# Patient Record
Sex: FEMALE | Race: WHITE | Employment: OTHER | URBAN - METROPOLITAN AREA
[De-identification: names, ages, dates, MRNs, and addresses within clinical notes are randomized per-mention and may not be internally consistent; named-entity substitution may affect disease eponyms.]

---

## 2017-10-10 PROBLEM — F41.1 GENERALIZED ANXIETY DISORDER: Status: ACTIVE | Noted: 2017-10-10

## 2017-10-10 PROBLEM — M81.0 OSTEOPOROSIS, SENILE: Status: ACTIVE | Noted: 2017-10-10

## 2017-11-20 PROBLEM — E66.3 OVERWEIGHT: Status: ACTIVE | Noted: 2017-11-20

## 2018-01-29 ENCOUNTER — HOSPITAL ENCOUNTER (OUTPATIENT)
Dept: MAMMOGRAPHY | Age: 73
Discharge: HOME | End: 2018-01-29
Attending: FAMILY MEDICINE
Payer: MEDICARE

## 2018-01-29 DIAGNOSIS — M81.0 OSTEOPOROSIS, SENILE: ICD-10-CM

## 2018-01-29 PROCEDURE — 77080 DXA BONE DENSITY AXIAL: CPT

## 2018-03-01 PROBLEM — K29.60 REFLUX GASTRITIS: Status: ACTIVE | Noted: 2018-03-01

## 2018-03-01 PROBLEM — G89.29 CHRONIC BILATERAL LOW BACK PAIN WITHOUT SCIATICA: Status: ACTIVE | Noted: 2018-03-01

## 2018-03-01 PROBLEM — M54.50 CHRONIC BILATERAL LOW BACK PAIN WITHOUT SCIATICA: Status: ACTIVE | Noted: 2018-03-01

## 2018-03-01 PROBLEM — Z79.4 CONTROLLED TYPE 2 DIABETES MELLITUS WITHOUT COMPLICATION, WITH LONG-TERM CURRENT USE OF INSULIN (HCC): Status: ACTIVE | Noted: 2018-03-01

## 2018-03-01 PROBLEM — E11.9 CONTROLLED TYPE 2 DIABETES MELLITUS WITHOUT COMPLICATION, WITH LONG-TERM CURRENT USE OF INSULIN (HCC): Status: ACTIVE | Noted: 2018-03-01

## 2018-08-31 PROBLEM — E53.8 VITAMIN B12 DEFICIENCY: Status: ACTIVE | Noted: 2018-08-31

## 2019-03-14 PROBLEM — Z23 ENCOUNTER FOR IMMUNIZATION: Status: ACTIVE | Noted: 2019-03-14

## 2019-03-14 PROBLEM — Z13.31 SCREENING FOR DEPRESSION: Status: ACTIVE | Noted: 2019-03-14

## 2019-03-14 PROBLEM — Z00.00 MEDICARE ANNUAL WELLNESS VISIT, SUBSEQUENT: Status: ACTIVE | Noted: 2019-03-14

## 2019-03-14 PROBLEM — Z13.39 SCREENING FOR ALCOHOLISM: Status: ACTIVE | Noted: 2019-03-14

## 2019-08-18 PROBLEM — G25.81 RESTLESS LEGS SYNDROME: Status: ACTIVE | Noted: 2019-08-18

## 2019-09-23 PROBLEM — Z00.00 MEDICARE ANNUAL WELLNESS VISIT, SUBSEQUENT: Status: RESOLVED | Noted: 2019-03-14 | Resolved: 2019-09-23

## 2019-09-23 PROBLEM — Z23 ENCOUNTER FOR IMMUNIZATION: Status: RESOLVED | Noted: 2019-03-14 | Resolved: 2019-09-23

## 2020-01-27 ENCOUNTER — HOSPITAL ENCOUNTER (OUTPATIENT)
Dept: CT IMAGING | Age: 75
Discharge: HOME | End: 2020-01-27
Attending: FAMILY MEDICINE
Payer: MEDICARE

## 2020-01-27 DIAGNOSIS — R10.31 RIGHT LOWER QUADRANT ABDOMINAL PAIN: ICD-10-CM

## 2020-01-27 LAB — CREAT BLD-MCNC: 0.5 MG/DL (ref 0.8–1.5)

## 2020-01-27 PROCEDURE — 74011000258 HC RX REV CODE- 258

## 2020-01-27 PROCEDURE — 82565 ASSAY OF CREATININE: CPT

## 2020-01-27 PROCEDURE — 74011636320 HC RX REV CODE- 636/320

## 2020-01-27 PROCEDURE — 74177 CT ABD & PELVIS W/CONTRAST: CPT

## 2020-01-27 RX ORDER — SODIUM CHLORIDE 0.9 % (FLUSH) 0.9 %
10 SYRINGE (ML) INJECTION
Status: COMPLETED | OUTPATIENT
Start: 2020-01-27 | End: 2020-01-27

## 2020-01-27 RX ADMIN — DIATRIZOATE MEGLUMINE AND DIATRIZOATE SODIUM 15 ML: 660; 100 LIQUID ORAL; RECTAL at 15:48

## 2020-01-27 RX ADMIN — IOPAMIDOL 100 ML: 755 INJECTION, SOLUTION INTRAVENOUS at 15:47

## 2020-01-27 RX ADMIN — SODIUM CHLORIDE 100 ML: 900 INJECTION, SOLUTION INTRAVENOUS at 15:48

## 2020-01-27 RX ADMIN — Medication 10 ML: at 15:47

## 2020-02-03 NOTE — PROGRESS NOTES
CT scan did not show any acute inflammatory process. However they found a 1.2 cm indeterminate left adrenal nodule- they suggest to do a CT or MRI to visualize it better. I would suggest doing an MRI. Let me know if you are ok with that. Sent as a reply to patient's My Chart message.

## 2020-02-05 ENCOUNTER — HOSPITAL ENCOUNTER (OUTPATIENT)
Dept: GENERAL RADIOLOGY | Age: 75
Discharge: HOME | End: 2020-02-05
Attending: FAMILY MEDICINE

## 2020-02-05 DIAGNOSIS — R13.19 ESOPHAGEAL DYSPHAGIA: ICD-10-CM

## 2020-02-18 PROBLEM — E27.8 ADRENAL NODULE (HCC): Status: ACTIVE | Noted: 2020-02-18

## 2020-02-20 ENCOUNTER — HOSPITAL ENCOUNTER (OUTPATIENT)
Dept: GENERAL RADIOLOGY | Age: 75
Discharge: HOME | End: 2020-02-20
Attending: FAMILY MEDICINE
Payer: MEDICARE

## 2020-02-20 PROCEDURE — 74011000255 HC RX REV CODE- 255

## 2020-02-20 PROCEDURE — 74011000250 HC RX REV CODE- 250

## 2020-02-20 PROCEDURE — 74220 X-RAY XM ESOPHAGUS 1CNTRST: CPT

## 2020-02-20 RX ADMIN — BARIUM SULFATE 135 ML: 980 POWDER, FOR SUSPENSION ORAL at 08:25

## 2020-02-20 RX ADMIN — ANTACID/ANTIFLATULENT 4 G: 380; 550; 10; 10 GRANULE, EFFERVESCENT ORAL at 08:25

## 2020-02-20 RX ADMIN — BARIUM SULFATE 355 ML: 0.6 SUSPENSION ORAL at 08:25

## 2020-02-20 RX ADMIN — BARIUM SULFATE 700 MG: 700 TABLET ORAL at 08:25

## 2020-02-27 NOTE — PROGRESS NOTES
Gastroesophageal reflux, tertiary esophageal contractions, no evidence of esophageal stricture.   Please advise patient to make a follow-up appointment with her gastroenterologist, Dr. Maldonado Villatoro, who did her colonoscopy

## 2020-06-02 ENCOUNTER — HOSPITAL ENCOUNTER (OUTPATIENT)
Dept: MRI IMAGING | Age: 75
Discharge: HOME | End: 2020-06-02
Attending: FAMILY MEDICINE
Payer: MEDICARE

## 2020-06-02 DIAGNOSIS — E27.8 ADRENAL NODULE (HCC): ICD-10-CM

## 2020-06-02 PROCEDURE — 74183 MRI ABD W/O CNTR FLWD CNTR: CPT

## 2020-06-02 PROCEDURE — A9575 INJ GADOTERATE MEGLUMI 0.1ML: HCPCS

## 2020-06-02 PROCEDURE — 74011250636 HC RX REV CODE- 250/636

## 2020-06-02 PROCEDURE — 74011000258 HC RX REV CODE- 258

## 2020-06-02 RX ORDER — SODIUM CHLORIDE 0.9 % (FLUSH) 0.9 %
10 SYRINGE (ML) INJECTION
Status: COMPLETED | OUTPATIENT
Start: 2020-06-02 | End: 2020-06-02

## 2020-06-02 RX ORDER — GADOTERATE MEGLUMINE 376.9 MG/ML
13 INJECTION INTRAVENOUS
Status: COMPLETED | OUTPATIENT
Start: 2020-06-02 | End: 2020-06-02

## 2020-06-02 RX ADMIN — GADOTERATE MEGLUMINE 13 ML: 376.9 INJECTION INTRAVENOUS at 09:23

## 2020-06-02 RX ADMIN — Medication 10 ML: at 09:23

## 2020-06-02 RX ADMIN — SODIUM CHLORIDE 100 ML: 900 INJECTION, SOLUTION INTRAVENOUS at 09:23

## 2020-06-03 NOTE — PROGRESS NOTES
1 cm left adrenal nodule, likely an adenoma-which is benign. Please ask patient if she still has abdominal painif so, we can refer her to gastroenterology.

## 2020-09-09 PROBLEM — Z78.0 POSTMENOPAUSE: Status: ACTIVE | Noted: 2020-09-09

## 2020-10-09 PROBLEM — Z00.00 MEDICARE ANNUAL WELLNESS VISIT, SUBSEQUENT: Status: RESOLVED | Noted: 2019-03-14 | Resolved: 2020-10-09

## 2020-10-09 PROBLEM — Z23 ENCOUNTER FOR IMMUNIZATION: Status: RESOLVED | Noted: 2019-03-14 | Resolved: 2020-10-09

## 2020-10-13 ENCOUNTER — HOSPITAL ENCOUNTER (OUTPATIENT)
Dept: MAMMOGRAPHY | Age: 75
Discharge: HOME | End: 2020-10-13
Attending: FAMILY MEDICINE
Payer: MEDICARE

## 2020-10-13 DIAGNOSIS — Z78.0 POSTMENOPAUSE: ICD-10-CM

## 2020-10-13 PROCEDURE — 77080 DXA BONE DENSITY AXIAL: CPT

## 2020-10-14 NOTE — PROGRESS NOTES
DEXA scan shows significant interval density loss in the lumbar spine and hips since January 2018 now shows osteopenia. Please advise patient to follow a diet rich in calcium and to take over-the-counter plain vitamin D 2000 units daily. Will need to repeat DEXA scan in 2 years.

## 2021-05-03 PROBLEM — E27.8 ADRENAL NODULE (HCC): Status: RESOLVED | Noted: 2020-02-18 | Resolved: 2021-05-03

## 2022-02-15 PROBLEM — J30.89 ENVIRONMENTAL AND SEASONAL ALLERGIES: Status: ACTIVE | Noted: 2022-02-15

## 2022-03-18 PROBLEM — M81.0 OSTEOPOROSIS, SENILE: Status: ACTIVE | Noted: 2017-10-10

## 2022-03-19 PROBLEM — Z13.31 SCREENING FOR DEPRESSION: Status: ACTIVE | Noted: 2019-03-14

## 2022-03-19 PROBLEM — Z13.39 SCREENING FOR ALCOHOLISM: Status: ACTIVE | Noted: 2019-03-14

## 2022-03-19 PROBLEM — G89.29 CHRONIC BILATERAL LOW BACK PAIN WITHOUT SCIATICA: Status: ACTIVE | Noted: 2018-03-01

## 2022-03-19 PROBLEM — E53.8 VITAMIN B12 DEFICIENCY: Status: ACTIVE | Noted: 2018-08-31

## 2022-03-19 PROBLEM — K29.60 REFLUX GASTRITIS: Status: ACTIVE | Noted: 2018-03-01

## 2022-03-19 PROBLEM — Z78.0 POSTMENOPAUSE: Status: ACTIVE | Noted: 2020-09-09

## 2022-03-19 PROBLEM — M54.50 CHRONIC BILATERAL LOW BACK PAIN WITHOUT SCIATICA: Status: ACTIVE | Noted: 2018-03-01

## 2022-03-19 PROBLEM — Z79.4 CONTROLLED TYPE 2 DIABETES MELLITUS WITHOUT COMPLICATION, WITH LONG-TERM CURRENT USE OF INSULIN (HCC): Status: ACTIVE | Noted: 2018-03-01

## 2022-03-19 PROBLEM — E11.9 CONTROLLED TYPE 2 DIABETES MELLITUS WITHOUT COMPLICATION, WITH LONG-TERM CURRENT USE OF INSULIN (HCC): Status: ACTIVE | Noted: 2018-03-01

## 2022-03-19 PROBLEM — E66.3 OVERWEIGHT: Status: ACTIVE | Noted: 2017-11-20

## 2022-03-19 PROBLEM — J30.89 ENVIRONMENTAL AND SEASONAL ALLERGIES: Status: ACTIVE | Noted: 2022-02-15

## 2022-03-19 PROBLEM — F41.1 GENERALIZED ANXIETY DISORDER: Status: ACTIVE | Noted: 2017-10-10

## 2022-03-19 PROBLEM — G25.81 RESTLESS LEGS SYNDROME: Status: ACTIVE | Noted: 2019-08-18

## 2022-05-31 ENCOUNTER — TELEPHONE (OUTPATIENT)
Dept: FAMILY MEDICINE CLINIC | Facility: CLINIC | Age: 77
End: 2022-05-31

## 2022-05-31 DIAGNOSIS — G25.81 RESTLESS LEGS SYNDROME: Primary | ICD-10-CM

## 2022-05-31 RX ORDER — ROPINIROLE 1 MG/1
1 TABLET, FILM COATED ORAL 2 TIMES DAILY
Qty: 180 TABLET | Refills: 0 | Status: SHIPPED | OUTPATIENT
Start: 2022-05-31 | End: 2022-08-10 | Stop reason: DRUGHIGH

## 2022-06-07 RX ORDER — ROPINIROLE 1 MG/1
TABLET, FILM COATED ORAL
Qty: 180 TABLET | OUTPATIENT
Start: 2022-06-07

## 2022-07-07 RX ORDER — LISINOPRIL 10 MG/1
TABLET ORAL
Qty: 90 TABLET | OUTPATIENT
Start: 2022-07-07

## 2022-07-07 RX ORDER — ATORVASTATIN CALCIUM 10 MG/1
TABLET, FILM COATED ORAL
Qty: 90 TABLET | OUTPATIENT
Start: 2022-07-07

## 2022-08-10 ENCOUNTER — OFFICE VISIT (OUTPATIENT)
Dept: FAMILY MEDICINE CLINIC | Facility: CLINIC | Age: 77
End: 2022-08-10
Payer: MEDICARE

## 2022-08-10 VITALS
SYSTOLIC BLOOD PRESSURE: 110 MMHG | BODY MASS INDEX: 26.31 KG/M2 | HEART RATE: 80 BPM | DIASTOLIC BLOOD PRESSURE: 68 MMHG | WEIGHT: 143 LBS | TEMPERATURE: 96.9 F | HEIGHT: 62 IN | OXYGEN SATURATION: 97 % | RESPIRATION RATE: 16 BRPM

## 2022-08-10 DIAGNOSIS — G25.81 RESTLESS LEGS SYNDROME: ICD-10-CM

## 2022-08-10 DIAGNOSIS — K21.9 GASTROESOPHAGEAL REFLUX DISEASE WITHOUT ESOPHAGITIS: ICD-10-CM

## 2022-08-10 DIAGNOSIS — E11.9 CONTROLLED TYPE 2 DIABETES MELLITUS WITHOUT COMPLICATION, WITH LONG-TERM CURRENT USE OF INSULIN (HCC): Primary | ICD-10-CM

## 2022-08-10 DIAGNOSIS — E11.9 CONTROLLED TYPE 2 DIABETES MELLITUS WITHOUT COMPLICATION, WITH LONG-TERM CURRENT USE OF INSULIN (HCC): ICD-10-CM

## 2022-08-10 DIAGNOSIS — Z79.4 CONTROLLED TYPE 2 DIABETES MELLITUS WITHOUT COMPLICATION, WITH LONG-TERM CURRENT USE OF INSULIN (HCC): ICD-10-CM

## 2022-08-10 DIAGNOSIS — E53.8 VITAMIN B12 DEFICIENCY: ICD-10-CM

## 2022-08-10 DIAGNOSIS — Z79.4 CONTROLLED TYPE 2 DIABETES MELLITUS WITHOUT COMPLICATION, WITH LONG-TERM CURRENT USE OF INSULIN (HCC): Primary | ICD-10-CM

## 2022-08-10 DIAGNOSIS — M54.41 ACUTE RIGHT-SIDED LOW BACK PAIN WITH RIGHT-SIDED SCIATICA: ICD-10-CM

## 2022-08-10 DIAGNOSIS — B37.31 VAGINAL YEAST INFECTION: ICD-10-CM

## 2022-08-10 DIAGNOSIS — G89.29 CHRONIC RIGHT SACROILIAC JOINT PAIN: ICD-10-CM

## 2022-08-10 DIAGNOSIS — M53.3 CHRONIC RIGHT SACROILIAC JOINT PAIN: ICD-10-CM

## 2022-08-10 DIAGNOSIS — F51.04 PSYCHOPHYSIOLOGICAL INSOMNIA: ICD-10-CM

## 2022-08-10 DIAGNOSIS — F41.1 GENERALIZED ANXIETY DISORDER: ICD-10-CM

## 2022-08-10 DIAGNOSIS — E66.3 OVERWEIGHT: ICD-10-CM

## 2022-08-10 LAB
BASOPHILS # BLD: 0.1 K/UL (ref 0–0.2)
BASOPHILS NFR BLD: 1 % (ref 0–2)
DIFFERENTIAL METHOD BLD: ABNORMAL
EOSINOPHIL # BLD: 0 K/UL (ref 0–0.8)
EOSINOPHIL NFR BLD: 1 % (ref 0.5–7.8)
ERYTHROCYTE [DISTWIDTH] IN BLOOD BY AUTOMATED COUNT: 13.8 % (ref 11.9–14.6)
HCT VFR BLD AUTO: 40.8 % (ref 35.8–46.3)
HGB BLD-MCNC: 12.7 G/DL (ref 11.7–15.4)
IMM GRANULOCYTES # BLD AUTO: 0 K/UL (ref 0–0.5)
IMM GRANULOCYTES NFR BLD AUTO: 0 % (ref 0–5)
LYMPHOCYTES # BLD: 1.7 K/UL (ref 0.5–4.6)
LYMPHOCYTES NFR BLD: 28 % (ref 13–44)
MCH RBC QN AUTO: 30.3 PG (ref 26.1–32.9)
MCHC RBC AUTO-ENTMCNC: 31.1 G/DL (ref 31.4–35)
MCV RBC AUTO: 97.4 FL (ref 79.6–97.8)
MONOCYTES # BLD: 0.5 K/UL (ref 0.1–1.3)
MONOCYTES NFR BLD: 8 % (ref 4–12)
NEUTS SEG # BLD: 3.6 K/UL (ref 1.7–8.2)
NEUTS SEG NFR BLD: 62 % (ref 43–78)
NRBC # BLD: 0 K/UL (ref 0–0.2)
PLATELET # BLD AUTO: 261 K/UL (ref 150–450)
PMV BLD AUTO: 11.3 FL (ref 9.4–12.3)
RBC # BLD AUTO: 4.19 M/UL (ref 4.05–5.2)
WBC # BLD AUTO: 5.8 K/UL (ref 4.3–11.1)

## 2022-08-10 PROCEDURE — 99215 OFFICE O/P EST HI 40 MIN: CPT | Performed by: FAMILY MEDICINE

## 2022-08-10 PROCEDURE — G8417 CALC BMI ABV UP PARAM F/U: HCPCS | Performed by: FAMILY MEDICINE

## 2022-08-10 PROCEDURE — 1036F TOBACCO NON-USER: CPT | Performed by: FAMILY MEDICINE

## 2022-08-10 PROCEDURE — 3051F HG A1C>EQUAL 7.0%<8.0%: CPT | Performed by: FAMILY MEDICINE

## 2022-08-10 PROCEDURE — 1090F PRES/ABSN URINE INCON ASSESS: CPT | Performed by: FAMILY MEDICINE

## 2022-08-10 PROCEDURE — G8427 DOCREV CUR MEDS BY ELIG CLIN: HCPCS | Performed by: FAMILY MEDICINE

## 2022-08-10 PROCEDURE — G8399 PT W/DXA RESULTS DOCUMENT: HCPCS | Performed by: FAMILY MEDICINE

## 2022-08-10 PROCEDURE — 1123F ACP DISCUSS/DSCN MKR DOCD: CPT | Performed by: FAMILY MEDICINE

## 2022-08-10 RX ORDER — GLIPIZIDE 10 MG/1
TABLET ORAL
Qty: 360 TABLET | Refills: 1 | Status: SHIPPED | OUTPATIENT
Start: 2022-08-10

## 2022-08-10 RX ORDER — ATORVASTATIN CALCIUM 10 MG/1
10 TABLET, FILM COATED ORAL DAILY
Qty: 90 TABLET | Refills: 1 | Status: SHIPPED | OUTPATIENT
Start: 2022-08-10

## 2022-08-10 RX ORDER — ROPINIROLE 2 MG/1
2 TABLET, FILM COATED ORAL 2 TIMES DAILY
Qty: 180 TABLET | Refills: 3 | Status: SHIPPED | OUTPATIENT
Start: 2022-08-10

## 2022-08-10 RX ORDER — METHYLPREDNISOLONE 4 MG/1
TABLET ORAL
Qty: 1 KIT | Refills: 0 | Status: SHIPPED | OUTPATIENT
Start: 2022-08-10 | End: 2022-08-16

## 2022-08-10 RX ORDER — PIOGLITAZONEHYDROCHLORIDE 15 MG/1
15 TABLET ORAL DAILY
Qty: 90 TABLET | OUTPATIENT
Start: 2022-08-10

## 2022-08-10 RX ORDER — LISINOPRIL 10 MG/1
10 TABLET ORAL DAILY
Qty: 90 TABLET | Refills: 1 | Status: SHIPPED | OUTPATIENT
Start: 2022-08-10

## 2022-08-10 RX ORDER — FLUCONAZOLE 150 MG/1
TABLET ORAL
Qty: 2 TABLET | Refills: 0 | Status: SHIPPED | OUTPATIENT
Start: 2022-08-10

## 2022-08-10 RX ORDER — PREGABALIN 100 MG/1
100 CAPSULE ORAL 2 TIMES DAILY
Qty: 180 CAPSULE | Refills: 1 | Status: SHIPPED | OUTPATIENT
Start: 2022-08-10 | End: 2022-09-09

## 2022-08-10 RX ORDER — TRAZODONE HYDROCHLORIDE 100 MG/1
100 TABLET ORAL NIGHTLY
Qty: 180 TABLET | Refills: 1 | Status: SHIPPED | OUTPATIENT
Start: 2022-08-10

## 2022-08-10 RX ORDER — OMEPRAZOLE 40 MG/1
40 CAPSULE, DELAYED RELEASE ORAL DAILY
Qty: 90 CAPSULE | Refills: 1 | Status: SHIPPED | OUTPATIENT
Start: 2022-08-10

## 2022-08-10 RX ORDER — BLOOD SUGAR DIAGNOSTIC
STRIP MISCELLANEOUS
COMMUNITY
Start: 2022-05-29

## 2022-08-10 RX ORDER — DICLOFENAC SODIUM 75 MG/1
75 TABLET, DELAYED RELEASE ORAL 2 TIMES DAILY
Qty: 180 TABLET | Refills: 1 | Status: SHIPPED | OUTPATIENT
Start: 2022-08-10

## 2022-08-10 RX ORDER — PIOGLITAZONEHYDROCHLORIDE 15 MG/1
15 TABLET ORAL DAILY
Qty: 30 TABLET | Refills: 0 | Status: SHIPPED | OUTPATIENT
Start: 2022-08-10 | End: 2022-08-30 | Stop reason: DRUGHIGH

## 2022-08-10 ASSESSMENT — PATIENT HEALTH QUESTIONNAIRE - PHQ9
SUM OF ALL RESPONSES TO PHQ QUESTIONS 1-9: 0
SUM OF ALL RESPONSES TO PHQ QUESTIONS 1-9: 0
1. LITTLE INTEREST OR PLEASURE IN DOING THINGS: 0
SUM OF ALL RESPONSES TO PHQ9 QUESTIONS 1 & 2: 0
SUM OF ALL RESPONSES TO PHQ QUESTIONS 1-9: 0
2. FEELING DOWN, DEPRESSED OR HOPELESS: 0
SUM OF ALL RESPONSES TO PHQ QUESTIONS 1-9: 0

## 2022-08-10 ASSESSMENT — ENCOUNTER SYMPTOMS
COUGH: 0
ABDOMINAL PAIN: 0
NAUSEA: 0
WHEEZING: 0
VOMITING: 0
DIARRHEA: 0
BACK PAIN: 1
SHORTNESS OF BREATH: 0
SINUS PAIN: 0

## 2022-08-10 NOTE — PROGRESS NOTES
tablet, R-0Normal  6. Overweight  7. BMI 26.0-26.9,adult  8. Generalized anxiety disorder  -     sertraline (ZOLOFT) 50 MG tablet; Take 1 tablet by mouth in the morning., Disp-90 tablet, R-1Normal  -     traZODone (DESYREL) 100 MG tablet; Take 1 tablet by mouth nightly Take 2 tabs at HS, Disp-180 tablet, R-1Normal  9. Psychophysiological insomnia  -     sertraline (ZOLOFT) 50 MG tablet; Take 1 tablet by mouth in the morning., Disp-90 tablet, R-1Normal  -     traZODone (DESYREL) 100 MG tablet; Take 1 tablet by mouth nightly Take 2 tabs at HS, Disp-180 tablet, R-1Normal  10. Acute right-sided low back pain with right-sided sciatica  -     diclofenac (VOLTAREN) 75 MG EC tablet; Take 1 tablet by mouth in the morning and 1 tablet before bedtime. , Disp-180 tablet, R-1Normal  -     methylPREDNISolone (MEDROL DOSEPACK) 4 MG tablet; Take by mouth., Disp-1 kit, R-0Normal  11. Chronic right sacroiliac joint pain    Await labs, DC Jardiance due to repeated yeast infections, start Diflucan, advised to take a Probiotic and plain yogurt. Start Actos 15 mg in am after breakfast, monitor sugars- keep a log. Increased Requip to 2 mg twice a day, referred to Neurology. Advised to use topical ice packs over the right buttock, to use a donut pillow, use topical analgesic ointment, minimize standing, continue Diclofenac bid, start Medrol dose pack with meals- discussed possible side effects. Advised patient to continue to lose weight. Also advised to exercise regularly, to eat healthy foods, and to control portion sizes. 08/10/22I have spent 62 minutes reviewing previous notes, test results and face-to-face with the patient discussing the diagnosis and importance of compliance with the treatment plan as well as documenting on the day of the visit. Return for 2 wks- fu DM, RLS virtual; after 9/15/22- AWV; 6 mths- med refills, fasting labs or prn sooner.          Subjective   SUBJECTIVE/OBJECTIVE:  HPI  Patient comes today for follow up of chronic medical problems. She is compliant with taking all her medicines, denies any side effects, denies any associated symptoms except as below. Diabetes, Overweight-  at last labs, HbA1c was 7.1 in March 2022; fasting sugars per log are 138- 86, 2 hrs PP are 92- 193; had sugars of 79 twice towards evening as she did not have a snack- feels bad if her sugars are below 90, has glucose tabs handy. She takes Metformin 1000 mg 1 tab bid, Glipizide 10 mg 2 tabs twice daily with meals, (stopped Toujeo 2 units in the past,) Jardiance 10 mg - says this will be the most affordable ($40) after paying their deductible. She reports having a vaginal yeast infection since she started it- reports vaginal itching and some whitish discharge. She has been using the otc Monistat without much help. She also takes Aspirin, Atorvastatin 10 mg, Lisinopril 10mg, fish oil capsule once a week. Denies any associated symptoms. Her last eye exam was 3 months ago, has had b/l cataract surgery on 3/29/21 and 4/12/21. She sees a Podiatrist, Dr Jenny Burk, had surgery in the left foot for inflamed bunion; uses Diabetic shoes, goes every 3 months. She is good about watching her sweets and carbohydrates. She has lost about 9 lbs in the last 6 months- says it must be from [email or web address], also eating smaller meals with her , walks at times. Restless legs- [she slowly tapered off and stopped Gabapentin (300 mg 1 capsule at lunch and 3 capsules at HS). We tried Requip upto 2 mg- stopped as it was not helping.]  She takes Lyrica 100 mg twice a day, increased Requip 1 mg to twice a day- was working good but worsened about 1 month ago- denies any side effects, bothers her to fall asleep and to stay asleep. She stopped using the calf sleeve at bedtime as it was not helping her RLS.     Generalized Anxiety and Insomnia- 1 daughter is out of long-term and her other daughter does not want to keep in touch with them, her  has multiple medical issues and is not very mobile. She is taking Zoloft 50 mg in am and Trazodone 100 mg 2 tabs at HS, Melatonin 10 mg 1 capsule at HS. She falls asleep around 3 am- gets 1-2 hours of sleep a night and goes to the bathroom in between 2 times a night, mainly unable to sleep from back pain and RLS. (Tried Doxepin - did not help her sleep at night, felt tired and drowsy the next day, had hallucinations when she took Ambien.) She takes Ativan 1 mg rarely now, on Oxycodone now. She denies any depression, anxiety, SI/ HI. Says she went to counseling many years ago- did not work, refuses counseling. (Arthritis in hands and low back pain- she hurt her lower back after caring for her  after his back surgery; denies any trauma or surgery to the back. She reports swelling in the joints at the base of her right 2nd, 3rd and 4th fingers and has permanent deformity in the joints with pain in the DIP and PIP joints of the right 2nd, 3rd and 4th fingers- hurt every day. Had right knee replacement on October 9th 2019, left foot surgery 3 weeks ago. She stopped Flexeril 10 mg and Ibuprofen, stopped Meloxicam 7.5 mg daily and is taking Diclofenac bid.)    Right back pain and Sciatica- ongoing for 2 weeks, reports a knot in the right upper back area- comes and goes, denies any precipitating factors; pain radiates upto just below the right bbuttock. Denies any tingling, numbness, weakness in the right leg, no limping. She is taking Diclofenac 75 mg twice a day with some pain relief. Vit B12 deficiency- she reports a h/o of this 20 years ago, used to take Vit B12 injections then, currently does not take a MVI and is taking an otc Vit B12 every other day- labs have been good; denies fatigue typically but tired now from not sleeping. GERD-  now takes Omeprazole 40 mg in am but stopped Pepcid 40 mg at HS.  She denies reflux symptoms/ indigestion, denies any uncomfortable feeling/ pain in the upper middle abdomen, no heartburn or swallowing difficulty, denies tasting acid in her mouth; denies any blood in stool or dark black stools. She denies any specific foods that bother her, drinks about 2-3 cups (cut back) of coffee in the morning which are half decaf- now takes with breakfast. She has seen Dr Garland Amanda- had EGD several years ago. Review of Systems   Constitutional:  Positive for fatigue. Negative for chills and fever. HENT:  Negative for congestion and sinus pain. Respiratory:  Negative for cough, shortness of breath and wheezing. Cardiovascular:  Negative for chest pain, palpitations and leg swelling. Gastrointestinal:  Negative for abdominal pain, diarrhea, nausea and vomiting. Endocrine: Negative for polydipsia, polyphagia and polyuria. Genitourinary:  Positive for vaginal discharge. Negative for vaginal bleeding and vaginal pain. Musculoskeletal:  Positive for back pain. Negative for gait problem and myalgias. Neurological:  Negative for tremors, weakness and numbness. Psychiatric/Behavioral:  Positive for sleep disturbance. Negative for behavioral problems, dysphoric mood and self-injury. The patient is not nervous/anxious and is not hyperactive. Objective   Physical Exam  Vitals and nursing note reviewed. Constitutional:       General: She is not in acute distress. HENT:      Mouth/Throat:      Mouth: Mucous membranes are moist.      Pharynx: Oropharynx is clear. Eyes:      Conjunctiva/sclera: Conjunctivae normal.      Pupils: Pupils are equal, round, and reactive to light. Comments: No pallor   Cardiovascular:      Rate and Rhythm: Normal rate and regular rhythm. Pulmonary:      Effort: Pulmonary effort is normal.      Breath sounds: Normal breath sounds. No wheezing. Abdominal:      General: Bowel sounds are normal.      Palpations: Abdomen is soft. Tenderness: There is no abdominal tenderness. Musculoskeletal:      Cervical back: Neck supple. No tenderness.       Right lower leg: No edema. Left lower leg: No edema. Comments: Non tender over lumbar spine, tender over right SI joint around S1-2, no knot palpable, SLRs are negative b/l   Neurological:      Mental Status: She is alert. Motor: No weakness. Gait: Gait normal.      Deep Tendon Reflexes: Reflexes normal.      Comments: Good muscle tone, power 5/5, DTRs are normal in the right LE   Psychiatric:      Comments: Slightly anxious, good eye contact, became tearful once today                An electronic signature was used to authenticate this note.     --Hollis Bose MD

## 2022-08-11 LAB
ALBUMIN SERPL-MCNC: 4.3 G/DL (ref 3.2–4.6)
ALBUMIN/GLOB SERPL: 1.3 {RATIO} (ref 1.2–3.5)
ALP SERPL-CCNC: 69 U/L (ref 50–136)
ALT SERPL-CCNC: 19 U/L (ref 12–65)
ANION GAP SERPL CALC-SCNC: 5 MMOL/L (ref 7–16)
AST SERPL-CCNC: 10 U/L (ref 15–37)
BILIRUB SERPL-MCNC: 0.4 MG/DL (ref 0.2–1.1)
BUN SERPL-MCNC: 25 MG/DL (ref 8–23)
CALCIUM SERPL-MCNC: 9.7 MG/DL (ref 8.3–10.4)
CHLORIDE SERPL-SCNC: 105 MMOL/L (ref 98–107)
CHOLEST SERPL-MCNC: 115 MG/DL
CO2 SERPL-SCNC: 27 MMOL/L (ref 21–32)
CREAT SERPL-MCNC: 0.6 MG/DL (ref 0.6–1)
CREAT UR-MCNC: 48 MG/DL
GLOBULIN SER CALC-MCNC: 3.2 G/DL (ref 2.3–3.5)
GLUCOSE SERPL-MCNC: 115 MG/DL (ref 65–100)
HDLC SERPL-MCNC: 58 MG/DL (ref 40–60)
HDLC SERPL: 2 {RATIO}
LDLC SERPL CALC-MCNC: 42.4 MG/DL
MICROALBUMIN UR-MCNC: 0.56 MG/DL
MICROALBUMIN/CREAT UR-RTO: 12 MG/G
POTASSIUM SERPL-SCNC: 5.2 MMOL/L (ref 3.5–5.1)
PROT SERPL-MCNC: 7.5 G/DL (ref 6.3–8.2)
SODIUM SERPL-SCNC: 137 MMOL/L (ref 136–145)
TRIGL SERPL-MCNC: 73 MG/DL (ref 35–150)
VIT B12 SERPL-MCNC: 933 PG/ML (ref 193–986)
VLDLC SERPL CALC-MCNC: 14.6 MG/DL (ref 6–23)

## 2022-08-12 LAB
EST. AVERAGE GLUCOSE BLD GHB EST-MCNC: 157 MG/DL
HBA1C MFR BLD: 7.1 % (ref 4.8–5.6)

## 2022-08-30 ENCOUNTER — OFFICE VISIT (OUTPATIENT)
Dept: FAMILY MEDICINE CLINIC | Facility: CLINIC | Age: 77
End: 2022-08-30
Payer: MEDICARE

## 2022-08-30 VITALS
WEIGHT: 144 LBS | HEIGHT: 62 IN | DIASTOLIC BLOOD PRESSURE: 58 MMHG | TEMPERATURE: 97 F | BODY MASS INDEX: 26.5 KG/M2 | HEART RATE: 75 BPM | RESPIRATION RATE: 16 BRPM | OXYGEN SATURATION: 98 % | SYSTOLIC BLOOD PRESSURE: 98 MMHG

## 2022-08-30 DIAGNOSIS — G25.81 RESTLESS LEGS SYNDROME: ICD-10-CM

## 2022-08-30 DIAGNOSIS — E11.9 CONTROLLED TYPE 2 DIABETES MELLITUS WITHOUT COMPLICATION, WITH LONG-TERM CURRENT USE OF INSULIN (HCC): Primary | ICD-10-CM

## 2022-08-30 DIAGNOSIS — E11.9 CONTROLLED TYPE 2 DIABETES MELLITUS WITHOUT COMPLICATION, WITH LONG-TERM CURRENT USE OF INSULIN (HCC): ICD-10-CM

## 2022-08-30 DIAGNOSIS — Z79.4 CONTROLLED TYPE 2 DIABETES MELLITUS WITHOUT COMPLICATION, WITH LONG-TERM CURRENT USE OF INSULIN (HCC): ICD-10-CM

## 2022-08-30 DIAGNOSIS — Z79.4 CONTROLLED TYPE 2 DIABETES MELLITUS WITHOUT COMPLICATION, WITH LONG-TERM CURRENT USE OF INSULIN (HCC): Primary | ICD-10-CM

## 2022-08-30 PROCEDURE — 3051F HG A1C>EQUAL 7.0%<8.0%: CPT | Performed by: FAMILY MEDICINE

## 2022-08-30 PROCEDURE — 99214 OFFICE O/P EST MOD 30 MIN: CPT | Performed by: FAMILY MEDICINE

## 2022-08-30 PROCEDURE — G8427 DOCREV CUR MEDS BY ELIG CLIN: HCPCS | Performed by: FAMILY MEDICINE

## 2022-08-30 PROCEDURE — G8417 CALC BMI ABV UP PARAM F/U: HCPCS | Performed by: FAMILY MEDICINE

## 2022-08-30 PROCEDURE — 1090F PRES/ABSN URINE INCON ASSESS: CPT | Performed by: FAMILY MEDICINE

## 2022-08-30 PROCEDURE — G8399 PT W/DXA RESULTS DOCUMENT: HCPCS | Performed by: FAMILY MEDICINE

## 2022-08-30 PROCEDURE — 1123F ACP DISCUSS/DSCN MKR DOCD: CPT | Performed by: FAMILY MEDICINE

## 2022-08-30 PROCEDURE — 1036F TOBACCO NON-USER: CPT | Performed by: FAMILY MEDICINE

## 2022-08-30 RX ORDER — PIOGLITAZONEHYDROCHLORIDE 30 MG/1
30 TABLET ORAL DAILY
Qty: 30 TABLET | Refills: 0 | Status: SHIPPED | OUTPATIENT
Start: 2022-08-30 | End: 2022-09-20 | Stop reason: DRUGHIGH

## 2022-08-30 RX ORDER — PIOGLITAZONEHYDROCHLORIDE 30 MG/1
30 TABLET ORAL DAILY
Qty: 90 TABLET | OUTPATIENT
Start: 2022-08-30

## 2022-08-30 ASSESSMENT — PATIENT HEALTH QUESTIONNAIRE - PHQ9
1. LITTLE INTEREST OR PLEASURE IN DOING THINGS: 0
SUM OF ALL RESPONSES TO PHQ QUESTIONS 1-9: 0
SUM OF ALL RESPONSES TO PHQ9 QUESTIONS 1 & 2: 0
SUM OF ALL RESPONSES TO PHQ QUESTIONS 1-9: 0
2. FEELING DOWN, DEPRESSED OR HOPELESS: 0

## 2022-08-30 NOTE — PROGRESS NOTES
Beverly Lopez (:  1945) is a 76 y.o. female,Established patient, here for evaluation of the following chief complaint(s):  Diabetes         ASSESSMENT/PLAN:  1. Controlled type 2 diabetes mellitus without complication, with long-term current use of insulin (HCC)  -     pioglitazone (ACTOS) 30 MG tablet; Take 1 tablet by mouth daily, Disp-30 tablet, R-0Normal  2. Restless legs syndrome    Increased dose of Actos to 30 mg, may increase to 45 mg after 1 week if sugars are still elevated. Advised to continue Requip 2 mg bid, await Neurology appointment. Discussed last labs. Advised to not take Lisinopril if her BP are low- less than 292 systolic or less than   60 diastolic. Discussed last labs. Return for f/u Diabetes- has an appt on  for AWV. Subjective   SUBJECTIVE/OBJECTIVE:  HPI  Patient comes today for follow up of-  Diabetes, Overweight-  at last labs, HbA1c was 7.1 in 2022; however we stopped Jardiance at her last OV due to repeated yeast infections, hence we started Actos 15 mg. As per log, fasting sugars are higher- 131- 231, 2 hrs PP are 171- 263 with readings of 298, 405, 305, 322, 344; denies e/o hypoglycemia- feels bad if her sugars are below 90, has glucose tabs handy. She also takes Metformin 1000 mg 1 tab bid, Glipizide 10 mg 2 tabs twice daily with meals, (stopped Toujeo 2 units in the past). She also takes Aspirin, Atorvastatin 10 mg, Lisinopril 10mg, fish oil capsule once a week. Denies any associated symptoms except increased urination last night. Her last eye exam was 3 months ago, has had b/l cataract surgery on 3/29/21 and 21. She sees a Podiatrist, Dr Asiya Sutherland, had surgery in the left foot for inflamed bunion; uses Diabetic shoes, goes every 3 months. She is good about watching her sweets and carbohydrates.  (She has lost about 9 lbs in the last 6 months- says it must be from [employer], also eating smaller meals with her , walks at times.) to authenticate this note.     --Leann Fleischer, MD

## 2022-09-05 ASSESSMENT — ENCOUNTER SYMPTOMS
VOMITING: 0
WHEEZING: 0
NAUSEA: 0
ABDOMINAL PAIN: 0
SINUS PAIN: 0
DIARRHEA: 0
SHORTNESS OF BREATH: 0
COUGH: 0

## 2022-09-20 ENCOUNTER — TELEMEDICINE (OUTPATIENT)
Dept: FAMILY MEDICINE CLINIC | Facility: CLINIC | Age: 77
End: 2022-09-20
Payer: MEDICARE

## 2022-09-20 DIAGNOSIS — Z79.4 CONTROLLED TYPE 2 DIABETES MELLITUS WITHOUT COMPLICATION, WITH LONG-TERM CURRENT USE OF INSULIN (HCC): Primary | ICD-10-CM

## 2022-09-20 DIAGNOSIS — E11.9 CONTROLLED TYPE 2 DIABETES MELLITUS WITHOUT COMPLICATION, WITH LONG-TERM CURRENT USE OF INSULIN (HCC): Primary | ICD-10-CM

## 2022-09-20 PROCEDURE — 99443 PR PHYS/QHP TELEPHONE EVALUATION 21-30 MIN: CPT | Performed by: FAMILY MEDICINE

## 2022-09-20 RX ORDER — PIOGLITAZONEHYDROCHLORIDE 45 MG/1
45 TABLET ORAL DAILY
Qty: 90 TABLET | Refills: 1 | Status: SHIPPED | OUTPATIENT
Start: 2022-09-20

## 2022-09-20 ASSESSMENT — ENCOUNTER SYMPTOMS
NAUSEA: 0
WHEEZING: 0
PHOTOPHOBIA: 0
ABDOMINAL PAIN: 0
SINUS PAIN: 0
VOMITING: 0
DIARRHEA: 0
COUGH: 0
SHORTNESS OF BREATH: 0

## 2022-09-20 NOTE — PROGRESS NOTES
Kathi Hendricks (:  1945) is a Established patient, here for evaluation of the following:    Assessment & Plan   Below is the assessment and plan developed based on review of pertinent history, physical exam, labs, studies, and medications. 1. Controlled type 2 diabetes mellitus without complication, with long-term current use of insulin (HCC)  -     pioglitazone (ACTOS) 45 MG tablet; Take 1 tablet by mouth daily, Disp-90 tablet, R-1Normal    Return for has an appt on - for AWV; has an appt on 23- med ref. Subjective   HPI  Virtual visit for follow up of-  Diabetes, (Overweight)-  at last labs, HbA1c was 7.1 in 2022; however we stopped Jardiance at her last OV due to repeated yeast infections, hence we started Actos 15 mg after breakfast- dose was increased to 30 mg about 3 weeks ago- says she increased to 45 mg about 9 days ago. As per patient, fasting sugars are 108- 128; 2 hrs PP are 230, 154, 169, 171 after breakfast, 172, 165, 178, 167, 90 after lunch; 214, 172, 278, 120 after supper; denies e/o hypoglycemia- feels bad if her sugars are below 90, has glucose tabs handy. She also takes Metformin 1000 mg 1 tab bid, Glipizide 10 mg 2 tabs twice daily with meals, (stopped Toujeo 2 units in the past). She also takes Aspirin, Atorvastatin 10 mg, Lisinopril 10mg, fish oil capsule once a week. Denies any associated symptoms except increased urination last night. Her last eye exam was 3-4 months ago, has had b/l cataract surgery on 3/29/21 and 21. She sees a Podiatrist, Dr Jose Chambers, had surgery in the left foot for inflamed bunion; uses Diabetic shoes, goes every 3 months. She is good about watching her sweets and carbohydrates. (She has lost about 9 lbs in the last 6 months- says it must be from [employer], also eating smaller meals with her , walks at times.)     Review of Systems   Constitutional:  Negative for chills and fever.    HENT:  Negative for congestion and sinus pain.    Eyes:  Negative for photophobia and visual disturbance. Respiratory:  Negative for cough, shortness of breath and wheezing. Cardiovascular:  Negative for chest pain, palpitations and leg swelling. Gastrointestinal:  Negative for abdominal pain, diarrhea, nausea and vomiting. Endocrine: Negative for polydipsia, polyphagia and polyuria. Objective   Patient-Reported Vitals  No data recorded     Physical Exam       Physical exam could not be done as this was a virtual phone visit. Mariangel Grissom, was evaluated through a synchronous (real-time) audio-video encounter. The patient (or guardian if applicable) is aware that this is a billable service, which includes applicable co-pays. This Virtual Visit was conducted with patient's (and/or legal guardian's) consent. The visit was conducted pursuant to the emergency declaration under the [facility], [address] [place] [address] [facility] authority and the [employer] General Act. Patient identification was verified, and a caregiver was present when appropriate. The patient was located at Home: [address] [place] [id number]. Provider was located at [facility] ([address] [place], [place] [address]t): [address] [facility],  [address] [facility].         --Benjamin Sanchez MD

## 2022-10-19 ENCOUNTER — OFFICE VISIT (OUTPATIENT)
Dept: FAMILY MEDICINE CLINIC | Facility: CLINIC | Age: 77
End: 2022-10-19
Payer: MEDICARE

## 2022-10-19 VITALS
RESPIRATION RATE: 16 BRPM | HEART RATE: 84 BPM | WEIGHT: 149 LBS | DIASTOLIC BLOOD PRESSURE: 72 MMHG | SYSTOLIC BLOOD PRESSURE: 120 MMHG | TEMPERATURE: 96.9 F | OXYGEN SATURATION: 98 % | HEIGHT: 62 IN | BODY MASS INDEX: 27.42 KG/M2

## 2022-10-19 DIAGNOSIS — M54.2 NECK PAIN ON LEFT SIDE: ICD-10-CM

## 2022-10-19 DIAGNOSIS — R51.9 OCCIPITAL HEADACHE: ICD-10-CM

## 2022-10-19 DIAGNOSIS — Z00.00 MEDICARE ANNUAL WELLNESS VISIT, SUBSEQUENT: Primary | ICD-10-CM

## 2022-10-19 DIAGNOSIS — Z78.0 POSTMENOPAUSE: ICD-10-CM

## 2022-10-19 PROCEDURE — G8399 PT W/DXA RESULTS DOCUMENT: HCPCS | Performed by: FAMILY MEDICINE

## 2022-10-19 PROCEDURE — G8484 FLU IMMUNIZE NO ADMIN: HCPCS | Performed by: FAMILY MEDICINE

## 2022-10-19 PROCEDURE — G8427 DOCREV CUR MEDS BY ELIG CLIN: HCPCS | Performed by: FAMILY MEDICINE

## 2022-10-19 PROCEDURE — 1036F TOBACCO NON-USER: CPT | Performed by: FAMILY MEDICINE

## 2022-10-19 PROCEDURE — 99214 OFFICE O/P EST MOD 30 MIN: CPT | Performed by: FAMILY MEDICINE

## 2022-10-19 PROCEDURE — 1123F ACP DISCUSS/DSCN MKR DOCD: CPT | Performed by: FAMILY MEDICINE

## 2022-10-19 PROCEDURE — G0439 PPPS, SUBSEQ VISIT: HCPCS | Performed by: FAMILY MEDICINE

## 2022-10-19 PROCEDURE — G8417 CALC BMI ABV UP PARAM F/U: HCPCS | Performed by: FAMILY MEDICINE

## 2022-10-19 PROCEDURE — 1090F PRES/ABSN URINE INCON ASSESS: CPT | Performed by: FAMILY MEDICINE

## 2022-10-19 RX ORDER — BACLOFEN 10 MG/1
10 TABLET ORAL NIGHTLY
Qty: 15 TABLET | Refills: 0 | Status: SHIPPED | OUTPATIENT
Start: 2022-10-19

## 2022-10-19 RX ORDER — MELOXICAM 15 MG/1
15 TABLET ORAL DAILY
Qty: 30 TABLET | Refills: 0 | Status: SHIPPED | OUTPATIENT
Start: 2022-10-19

## 2022-10-19 ASSESSMENT — PATIENT HEALTH QUESTIONNAIRE - PHQ9
1. LITTLE INTEREST OR PLEASURE IN DOING THINGS: 0
SUM OF ALL RESPONSES TO PHQ QUESTIONS 1-9: 0
SUM OF ALL RESPONSES TO PHQ9 QUESTIONS 1 & 2: 0
SUM OF ALL RESPONSES TO PHQ QUESTIONS 1-9: 0
1. LITTLE INTEREST OR PLEASURE IN DOING THINGS: 0
SUM OF ALL RESPONSES TO PHQ QUESTIONS 1-9: 0
SUM OF ALL RESPONSES TO PHQ QUESTIONS 1-9: 0
SUM OF ALL RESPONSES TO PHQ9 QUESTIONS 1 & 2: 0
SUM OF ALL RESPONSES TO PHQ QUESTIONS 1-9: 0
2. FEELING DOWN, DEPRESSED OR HOPELESS: 0
SUM OF ALL RESPONSES TO PHQ QUESTIONS 1-9: 0
2. FEELING DOWN, DEPRESSED OR HOPELESS: 0
SUM OF ALL RESPONSES TO PHQ QUESTIONS 1-9: 0
SUM OF ALL RESPONSES TO PHQ QUESTIONS 1-9: 0

## 2022-10-19 ASSESSMENT — LIFESTYLE VARIABLES
HOW MANY STANDARD DRINKS CONTAINING ALCOHOL DO YOU HAVE ON A TYPICAL DAY: 1 OR 2
HOW OFTEN DO YOU HAVE A DRINK CONTAINING ALCOHOL: MONTHLY OR LESS

## 2022-10-19 NOTE — PROGRESS NOTES
Medicare Annual Wellness Visit    Kira Kang is here for Medicare AWV and Headache    Assessment & Plan   Medicare annual wellness visit, subsequent  Postmenopause  -     DEXA BONE DENSITY AXIAL SKELETON; Future  Neck pain on left side  -     meloxicam (MOBIC) 15 MG tablet; Take 1 tablet by mouth daily, Disp-30 tablet, R-0Normal  -     baclofen (LIORESAL) 10 MG tablet; Take 1 tablet by mouth at bedtime, Disp-15 tablet, R-0Normal  Occipital headache      Recommendations for Preventive Services Due: see orders and patient instructions/AVS.  Recommended screening schedule for the next 5-10 years is provided to the patient in written form: see Patient Instructions/AVS.   Patient does have a living will, Healthcare POA and Advance directive. Advised patient to bring a copy of the same to their chart - patient understands and agrees; says her  has a copy of the forms. She takes the flu vaccine every year in the fall- took on 9/14/2022, Pneumovax 23 on 8/9/2016, Prevnar 13 on 10/26/2015, says she took the Varivax vaccine several years ago; refuses the Shingrix vaccine due to cost. Says she took the TDaP vaccine at the pharmacy - advised to call us back with the date. She took the Covid vaccines on 1/24/21, 2/22/21, 10/25/21- advised to take another booster. Her last colonoscopy was on 6/14/17- says it was good; her last mammogram was on 8/13/18- patient refuses to do it any more has also aged out, denies any associated symptoms; denies any family h/o oclon or breast cancer. Her last bone density was on 10/13/2020- was normal; reordered today and script given to call and schedule herself. Advised patient to use topical heating pads and topical analgesic ointments on the left side of the neck, start Meloxicam daily after breakfast and Baclofen daily at HS- discussed possible side effects, to do slow stretching exercises. Return for Medicare Annual Wellness Visit in 1 year.      Subjective   The following acute and/or chronic problems were also addressed today:  Left Headache, left neck pain- says she took a fall on concrete about 1 year ago, developed a crackle in her neck since that fall- crackle has been worse for the last 1 month. Today she reports a bad headache over the left occipital area and the left neck x 1 month; tried Tylenol 1 gm twice a day- did not help, tried Advil- did not help, tried heat for a few days- did not help. She cannot turn her head to the left side. Denies any h/o migraines; no pain when she lays down and is able to sleep well, restarts in 10 mins after she gets up, max pain is 8/10, denies changes in her bed, denies any current stressors, denies tingling, numbness, weakness, speech difficulty, syncope, nausea, light or sound sensitivity. Patient's complete Health Risk Assessment and screening values have been reviewed and are found in Flowsheets. The following problems were reviewed today and where indicated follow up appointments were made and/or referrals ordered.     Positive Risk Factor Screenings with Interventions:             General Health and ACP:  General  In general, how would you say your health is?: Very Good  In the past 7 days, have you experienced any of the following: New or Increased Pain, New or Increased Fatigue, Loneliness, Social Isolation, Stress or Anger?: (!) Yes  Select all that apply: (!) New or Increased Pain  Do you get the social and emotional support that you need?: Yes  Do you have a Living Will?: Yes    Advance Directives       Power of  Living Will ACP-Advance Directive ACP-Power of     Not on File Not on File Not on File Not on File          General Health Risk Interventions:  Advised to bring us a copy of her AD and [address] Habits/Nutrition:  Physical Activity: Insufficiently Active    Days of Exercise per Week: 3 days    Minutes of Exercise per Session: 30 min     Have you lost any weight without trying in the past 3 months?: No  Body mass index: (!) 27.25  Have you seen the dentist within the past year?: (!) No  Health Habits/Nutrition Interventions:  Has dentures        Review of Systems   Constitutional:  Negative for chills and fever. HENT:  Negative for congestion and sinus pain. Eyes:  Negative for photophobia and visual disturbance. Respiratory:  Negative for cough, shortness of breath and wheezing. Cardiovascular:  Negative for chest pain, palpitations and leg swelling. Gastrointestinal:  Negative for abdominal pain, diarrhea, nausea and vomiting. Musculoskeletal:  Positive for neck pain and neck stiffness. Negative for arthralgias, back pain, gait problem and joint swelling. Neurological:  Positive for headaches. Negative for dizziness, tremors, seizures, syncope, facial asymmetry, speech difficulty, weakness, light-headedness and numbness. Objective   Vitals:    10/19/22 1457   BP: 120/72   Pulse: 84   Resp: 16   Temp: 96.9 °F (36.1 °C)   TempSrc: Tympanic   SpO2: 98%   Weight: 149 lb (67.6 kg)   Height: 5' 2\" (1.575 m)      Body mass index is 27.25 kg/m². Physical Exam  Constitutional:       General: She is in acute distress. HENT:      Head: Normocephalic and atraumatic. Comments: No sinus tenderness     Mouth/Throat:      Mouth: Mucous membranes are moist.      Pharynx: Oropharynx is clear. Eyes:      Extraocular Movements: Extraocular movements intact. Conjunctiva/sclera: Conjunctivae normal.      Pupils: Pupils are equal, round, and reactive to light. Neck:      Comments: Left posterior neck- tender over the posterior muscles from the left occiput to the superior aspect of the scapula with muscle spasm. ROM is quite limited with turning to the left. Pulmonary:      Effort: Pulmonary effort is normal.      Breath sounds: Normal breath sounds. Abdominal:      General: Bowel sounds are normal.      Palpations: Abdomen is soft. Tenderness:  There is no abdominal tenderness. Musculoskeletal:      Cervical back: Tenderness present. Lymphadenopathy:      Cervical: No cervical adenopathy. Neurological:      General: No focal deficit present. Mental Status: She is alert. Mental status is at baseline. Cranial Nerves: No cranial nerve deficit. Sensory: No sensory deficit. Motor: No weakness. Coordination: Coordination normal.      Gait: Gait normal.      Deep Tendon Reflexes: Reflexes normal.       No Known Allergies  Prior to Visit Medications    Medication Sig Taking? Authorizing Provider   meloxicam (MOBIC) 15 MG tablet Take 1 tablet by mouth daily Yes Tian Hernández MD   baclofen (LIORESAL) 10 MG tablet Take 1 tablet by mouth at bedtime Yes Tian Hernández MD   pioglitazone (ACTOS) 45 MG tablet Take 1 tablet by mouth daily Yes Tian Hernández MD   ACCU-CHEK GUIDE strip CHECK SUGARS ONCE A DAY IN THE MORNING Yes Historical Provider, MD   cyanocobalamin 1000 MCG tablet Take 1,000 mcg by mouth every other day Yes Historical Provider, MD   atorvastatin (LIPITOR) 10 MG tablet Take 1 tablet by mouth in the morning. Yes Tian Hernández MD   diclofenac (VOLTAREN) 75 MG EC tablet Take 1 tablet by mouth in the morning and 1 tablet before bedtime. Yes Tian Hernández MD   glipiZIDE (GLUCOTROL) 10 MG tablet Take 2 tabs po twice daily with meals Yes Tian Hernández MD   lisinopril (PRINIVIL;ZESTRIL) 10 MG tablet Take 1 tablet by mouth in the morning. Yes Tian Hernández MD   metFORMIN (GLUCOPHAGE) 1000 MG tablet Take 1 tablet by mouth in the morning and 1 tablet in the evening. Take with meals. Yes Tian Hernández MD   omeprazole (PRILOSEC) 40 MG delayed release capsule Take 1 capsule by mouth in the morning. Yes Tian Hernández MD   sertraline (ZOLOFT) 50 MG tablet Take 1 tablet by mouth in the morning.  Yes Tian Hernández MD   traZODone (DESYREL) 100 MG tablet Take 1 tablet by mouth nightly Take 2 tabs at HS Yes Calli Ruelas

## 2022-10-19 NOTE — PATIENT INSTRUCTIONS
Personalized Preventive Plan for Rigo Crandall - 10/19/2022  Medicare offers a range of preventive health benefits. Some of the tests and screenings are paid in full while other may be subject to a deductible, co-insurance, and/or copay. Some of these benefits include a comprehensive review of your medical history including lifestyle, illnesses that may run in your family, and various assessments and screenings as appropriate. After reviewing your medical record and screening and assessments performed today your provider may have ordered immunizations, labs, imaging, and/or referrals for you. A list of these orders (if applicable) as well as your Preventive Care list are included within your After Visit Summary for your review. Other Preventive Recommendations:    A preventive eye exam performed by an eye specialist is recommended every 1-2 years to screen for glaucoma; cataracts, macular degeneration, and other eye disorders. A preventive dental visit is recommended every 6 months. Try to get at least 150 minutes of exercise per week or 10,000 steps per day on a pedometer . Order or download the FREE \"Exercise & Physical Activity: Your Everyday Guide\" from [employer]. Call 5-401.931.2768 or search [employer] online. You need 8311-6773 mg of calcium and 8453-1907 IU of vitamin D per day. It is possible to meet your calcium requirement with diet alone, but a vitamin D supplement is usually necessary to meet this goal.  When exposed to the sun, use a sunscreen that protects against both UVA and UVB radiation with an SPF of 30 or greater. Reapply every 2 to 3 hours or after sweating, drying off with a towel, or swimming. Always wear a seat belt when traveling in a car. Always wear a helmet when riding a bicycle or motorcycle.

## 2022-10-19 NOTE — ACP (ADVANCE CARE PLANNING)
Date of ACP Conversation: 10/19/22  Persons included in Conversation: Patient  Length of ACP Conversation in minutes: 5 minutes    Authorized Decision Maker (if patient is incapable of making informed decisions): NA          For Patients with Decision Making Capacity:   Patient does have a Living Will, [address] Conversation Outcomes / Follow-Up Plan:   Advised patient to bring a copy of the same to their chart - patient understands and agrees; says her  has a copy of the forms.

## 2022-10-20 RX ORDER — MELOXICAM 15 MG/1
15 TABLET ORAL DAILY
Qty: 90 TABLET | OUTPATIENT
Start: 2022-10-20

## 2022-10-20 ASSESSMENT — ENCOUNTER SYMPTOMS
BACK PAIN: 0
DIARRHEA: 0
NAUSEA: 0
PHOTOPHOBIA: 0
SINUS PAIN: 0
WHEEZING: 0
VOMITING: 0
ABDOMINAL PAIN: 0
SHORTNESS OF BREATH: 0
COUGH: 0

## 2022-11-18 PROBLEM — Z00.00 MEDICARE ANNUAL WELLNESS VISIT, SUBSEQUENT: Status: RESOLVED | Noted: 2022-10-19 | Resolved: 2022-11-18

## 2023-02-02 ENCOUNTER — OFFICE VISIT (OUTPATIENT)
Dept: FAMILY MEDICINE CLINIC | Facility: CLINIC | Age: 78
End: 2023-02-02
Payer: MEDICARE

## 2023-02-02 VITALS
DIASTOLIC BLOOD PRESSURE: 70 MMHG | BODY MASS INDEX: 28.52 KG/M2 | WEIGHT: 155 LBS | SYSTOLIC BLOOD PRESSURE: 122 MMHG | OXYGEN SATURATION: 96 % | HEIGHT: 62 IN | TEMPERATURE: 97.1 F | RESPIRATION RATE: 16 BRPM | HEART RATE: 85 BPM

## 2023-02-02 DIAGNOSIS — F51.04 PSYCHOPHYSIOLOGICAL INSOMNIA: ICD-10-CM

## 2023-02-02 DIAGNOSIS — K21.9 GASTROESOPHAGEAL REFLUX DISEASE WITHOUT ESOPHAGITIS: ICD-10-CM

## 2023-02-02 DIAGNOSIS — G25.81 RESTLESS LEGS SYNDROME: ICD-10-CM

## 2023-02-02 DIAGNOSIS — F41.1 GENERALIZED ANXIETY DISORDER: ICD-10-CM

## 2023-02-02 DIAGNOSIS — M54.41 ACUTE RIGHT-SIDED LOW BACK PAIN WITH RIGHT-SIDED SCIATICA: Primary | ICD-10-CM

## 2023-02-02 DIAGNOSIS — E11.65 TYPE 2 DIABETES MELLITUS WITH HYPERGLYCEMIA, WITHOUT LONG-TERM CURRENT USE OF INSULIN (HCC): ICD-10-CM

## 2023-02-02 DIAGNOSIS — E66.3 OVERWEIGHT: ICD-10-CM

## 2023-02-02 LAB
ALBUMIN SERPL-MCNC: 3.9 G/DL (ref 3.2–4.6)
ALBUMIN/GLOB SERPL: 1.4 (ref 0.4–1.6)
ALP SERPL-CCNC: 64 U/L (ref 50–136)
ALT SERPL-CCNC: 16 U/L (ref 12–65)
ANION GAP SERPL CALC-SCNC: 5 MMOL/L (ref 2–11)
AST SERPL-CCNC: 10 U/L (ref 15–37)
BASOPHILS # BLD: 0 K/UL (ref 0–0.2)
BASOPHILS NFR BLD: 1 % (ref 0–2)
BILIRUB SERPL-MCNC: 0.4 MG/DL (ref 0.2–1.1)
BUN SERPL-MCNC: 15 MG/DL (ref 8–23)
CALCIUM SERPL-MCNC: 9.1 MG/DL (ref 8.3–10.4)
CHLORIDE SERPL-SCNC: 106 MMOL/L (ref 101–110)
CHOLEST SERPL-MCNC: 120 MG/DL
CO2 SERPL-SCNC: 28 MMOL/L (ref 21–32)
CREAT SERPL-MCNC: 0.6 MG/DL (ref 0.6–1)
CREAT UR-MCNC: 94 MG/DL
DIFFERENTIAL METHOD BLD: ABNORMAL
EOSINOPHIL # BLD: 0 K/UL (ref 0–0.8)
EOSINOPHIL NFR BLD: 0 % (ref 0.5–7.8)
ERYTHROCYTE [DISTWIDTH] IN BLOOD BY AUTOMATED COUNT: 14.3 % (ref 11.9–14.6)
EST. AVERAGE GLUCOSE BLD GHB EST-MCNC: 166 MG/DL
GLOBULIN SER CALC-MCNC: 2.8 G/DL (ref 2.8–4.5)
GLUCOSE SERPL-MCNC: 153 MG/DL (ref 65–100)
HBA1C MFR BLD: 7.4 % (ref 4.8–5.6)
HCT VFR BLD AUTO: 33.7 % (ref 35.8–46.3)
HDLC SERPL-MCNC: 58 MG/DL (ref 40–60)
HDLC SERPL: 2.1
HGB BLD-MCNC: 10.7 G/DL (ref 11.7–15.4)
IMM GRANULOCYTES # BLD AUTO: 0 K/UL (ref 0–0.5)
IMM GRANULOCYTES NFR BLD AUTO: 0 % (ref 0–5)
LDLC SERPL CALC-MCNC: 51.8 MG/DL
LYMPHOCYTES # BLD: 1.3 K/UL (ref 0.5–4.6)
LYMPHOCYTES NFR BLD: 31 % (ref 13–44)
MCH RBC QN AUTO: 31 PG (ref 26.1–32.9)
MCHC RBC AUTO-ENTMCNC: 31.8 G/DL (ref 31.4–35)
MCV RBC AUTO: 97.7 FL (ref 82–102)
MICROALBUMIN UR-MCNC: 0.96 MG/DL (ref 0–3)
MICROALBUMIN/CREAT UR-RTO: 10 MG/G (ref 0–30)
MONOCYTES # BLD: 0.4 K/UL (ref 0.1–1.3)
MONOCYTES NFR BLD: 10 % (ref 4–12)
NEUTS SEG # BLD: 2.5 K/UL (ref 1.7–8.2)
NEUTS SEG NFR BLD: 58 % (ref 43–78)
NRBC # BLD: 0 K/UL (ref 0–0.2)
PLATELET # BLD AUTO: 231 K/UL (ref 150–450)
PMV BLD AUTO: 11.1 FL (ref 9.4–12.3)
POTASSIUM SERPL-SCNC: 4.5 MMOL/L (ref 3.5–5.1)
PROT SERPL-MCNC: 6.7 G/DL (ref 6.3–8.2)
RBC # BLD AUTO: 3.45 M/UL (ref 4.05–5.2)
SODIUM SERPL-SCNC: 139 MMOL/L (ref 133–143)
TRIGL SERPL-MCNC: 51 MG/DL (ref 35–150)
VLDLC SERPL CALC-MCNC: 10.2 MG/DL (ref 6–23)
WBC # BLD AUTO: 4.3 K/UL (ref 4.3–11.1)

## 2023-02-02 PROCEDURE — 99215 OFFICE O/P EST HI 40 MIN: CPT | Performed by: FAMILY MEDICINE

## 2023-02-02 PROCEDURE — G8399 PT W/DXA RESULTS DOCUMENT: HCPCS | Performed by: FAMILY MEDICINE

## 2023-02-02 PROCEDURE — G8427 DOCREV CUR MEDS BY ELIG CLIN: HCPCS | Performed by: FAMILY MEDICINE

## 2023-02-02 PROCEDURE — G8484 FLU IMMUNIZE NO ADMIN: HCPCS | Performed by: FAMILY MEDICINE

## 2023-02-02 PROCEDURE — 96372 THER/PROPH/DIAG INJ SC/IM: CPT | Performed by: FAMILY MEDICINE

## 2023-02-02 PROCEDURE — 1123F ACP DISCUSS/DSCN MKR DOCD: CPT | Performed by: FAMILY MEDICINE

## 2023-02-02 PROCEDURE — 1090F PRES/ABSN URINE INCON ASSESS: CPT | Performed by: FAMILY MEDICINE

## 2023-02-02 PROCEDURE — 1036F TOBACCO NON-USER: CPT | Performed by: FAMILY MEDICINE

## 2023-02-02 PROCEDURE — G8417 CALC BMI ABV UP PARAM F/U: HCPCS | Performed by: FAMILY MEDICINE

## 2023-02-02 RX ORDER — LISINOPRIL 10 MG/1
10 TABLET ORAL DAILY
Qty: 90 TABLET | Refills: 1 | Status: SHIPPED | OUTPATIENT
Start: 2023-02-02

## 2023-02-02 RX ORDER — ROPINIROLE 2 MG/1
2 TABLET, FILM COATED ORAL 2 TIMES DAILY
Qty: 180 TABLET | Refills: 1 | Status: SHIPPED | OUTPATIENT
Start: 2023-02-02

## 2023-02-02 RX ORDER — GLIPIZIDE 10 MG/1
TABLET ORAL
Qty: 360 TABLET | Refills: 1 | Status: SHIPPED | OUTPATIENT
Start: 2023-02-02

## 2023-02-02 RX ORDER — METHYLPREDNISOLONE 4 MG/1
TABLET ORAL
Qty: 1 KIT | Refills: 0 | Status: SHIPPED | OUTPATIENT
Start: 2023-02-02

## 2023-02-02 RX ORDER — PIOGLITAZONEHYDROCHLORIDE 45 MG/1
45 TABLET ORAL DAILY
Qty: 90 TABLET | Refills: 1 | Status: SHIPPED | OUTPATIENT
Start: 2023-02-02

## 2023-02-02 RX ORDER — OMEPRAZOLE 40 MG/1
40 CAPSULE, DELAYED RELEASE ORAL DAILY
Qty: 90 CAPSULE | Refills: 1 | Status: SHIPPED | OUTPATIENT
Start: 2023-02-02

## 2023-02-02 RX ORDER — ATORVASTATIN CALCIUM 10 MG/1
10 TABLET, FILM COATED ORAL DAILY
Qty: 90 TABLET | Refills: 1 | Status: SHIPPED | OUTPATIENT
Start: 2023-02-02

## 2023-02-02 RX ORDER — LORAZEPAM 0.5 MG/1
0.5 TABLET ORAL PRN
Qty: 15 TABLET | Refills: 0 | Status: SHIPPED | OUTPATIENT
Start: 2023-02-02 | End: 2023-03-04

## 2023-02-02 RX ORDER — PREGABALIN 100 MG/1
100 CAPSULE ORAL 2 TIMES DAILY
Qty: 180 CAPSULE | Refills: 1 | Status: SHIPPED | OUTPATIENT
Start: 2023-02-02 | End: 2023-03-04

## 2023-02-02 RX ORDER — BACLOFEN 10 MG/1
10 TABLET ORAL
Qty: 15 TABLET | Refills: 0 | Status: SHIPPED | OUTPATIENT
Start: 2023-02-02

## 2023-02-02 RX ORDER — DICLOFENAC SODIUM 75 MG/1
75 TABLET, DELAYED RELEASE ORAL 2 TIMES DAILY
Qty: 180 TABLET | Refills: 1 | Status: SHIPPED | OUTPATIENT
Start: 2023-02-02

## 2023-02-02 RX ORDER — TRAZODONE HYDROCHLORIDE 100 MG/1
100 TABLET ORAL NIGHTLY
Qty: 180 TABLET | Refills: 1 | Status: SHIPPED | OUTPATIENT
Start: 2023-02-02

## 2023-02-02 RX ORDER — METHYLPREDNISOLONE ACETATE 40 MG/ML
40 INJECTION, SUSPENSION INTRA-ARTICULAR; INTRALESIONAL; INTRAMUSCULAR; SOFT TISSUE ONCE
Status: COMPLETED | OUTPATIENT
Start: 2023-02-02 | End: 2023-02-02

## 2023-02-02 RX ORDER — GLUCOSAMINE HCL/CHONDROITIN SU 500-400 MG
CAPSULE ORAL
Qty: 100 STRIP | Refills: 3 | Status: SHIPPED | OUTPATIENT
Start: 2023-02-02

## 2023-02-02 RX ADMIN — METHYLPREDNISOLONE ACETATE 40 MG: 40 INJECTION, SUSPENSION INTRA-ARTICULAR; INTRALESIONAL; INTRAMUSCULAR; SOFT TISSUE at 10:11

## 2023-02-02 SDOH — ECONOMIC STABILITY: INCOME INSECURITY: HOW HARD IS IT FOR YOU TO PAY FOR THE VERY BASICS LIKE FOOD, HOUSING, MEDICAL CARE, AND HEATING?: NOT HARD AT ALL

## 2023-02-02 SDOH — ECONOMIC STABILITY: FOOD INSECURITY: WITHIN THE PAST 12 MONTHS, THE FOOD YOU BOUGHT JUST DIDN'T LAST AND YOU DIDN'T HAVE MONEY TO GET MORE.: NEVER TRUE

## 2023-02-02 SDOH — ECONOMIC STABILITY: FOOD INSECURITY: WITHIN THE PAST 12 MONTHS, YOU WORRIED THAT YOUR FOOD WOULD RUN OUT BEFORE YOU GOT MONEY TO BUY MORE.: NEVER TRUE

## 2023-02-02 SDOH — ECONOMIC STABILITY: HOUSING INSECURITY
IN THE LAST 12 MONTHS, WAS THERE A TIME WHEN YOU DID NOT HAVE A STEADY PLACE TO SLEEP OR SLEPT IN A SHELTER (INCLUDING NOW)?: NO

## 2023-02-02 ASSESSMENT — PATIENT HEALTH QUESTIONNAIRE - PHQ9
SUM OF ALL RESPONSES TO PHQ QUESTIONS 1-9: 0
2. FEELING DOWN, DEPRESSED OR HOPELESS: 0
SUM OF ALL RESPONSES TO PHQ QUESTIONS 1-9: 0

## 2023-02-02 ASSESSMENT — ENCOUNTER SYMPTOMS
VOMITING: 0
SINUS PAIN: 0
NAUSEA: 0
ABDOMINAL PAIN: 0
DIARRHEA: 0
SHORTNESS OF BREATH: 0
WHEEZING: 0
BACK PAIN: 0
COUGH: 0

## 2023-02-02 NOTE — PROGRESS NOTES
Andreas Faustin (:  1945) is a 76 y.o. female,Established patient, here for evaluation of the following chief complaint(s):  Medication Refill and Labs Only         ASSESSMENT/PLAN:  1. Acute right-sided low back pain with right-sided sciatica  -     diclofenac (VOLTAREN) 75 MG EC tablet; Take 1 tablet by mouth 2 times daily, Disp-180 tablet, R-1Normal  -     methylPREDNISolone (MEDROL DOSEPACK) 4 MG tablet; Take by mouth with meals, Disp-1 kit, R-0Normal  -     baclofen (LIORESAL) 10 MG tablet; Take 1 tablet by mouth nightly, Disp-15 tablet, R-0Normal  -     methylPREDNISolone acetate (DEPO-MEDROL) injection 40 mg; 40 mg, IntraMUSCular, ONCE, 1 dose, On Thu 23 at 1030  2. Gastroesophageal reflux disease without esophagitis  -     omeprazole (PRILOSEC) 40 MG delayed release capsule; Take 1 capsule by mouth daily, Disp-90 capsule, R-1Normal  3. Restless legs syndrome  -     pregabalin (LYRICA) 100 MG capsule; Take 1 capsule by mouth 2 times daily for 30 days. TAKE ONE CAPSULE BY MOUTH TWICE A DAY, THE MAXIMUM DAILY AMOUNT IS 2 TABLETS, Disp-180 capsule, R-1Normal  -     rOPINIRole (REQUIP) 2 MG tablet; Take 1 tablet by mouth 2 times daily, Disp-180 tablet, R-1Normal  4. Generalized anxiety disorder  -     traZODone (DESYREL) 100 MG tablet; Take 1 tablet by mouth nightly Take 2 tabs at HS, Disp-180 tablet, R-1Normal  -     LORazepam (ATIVAN) 0.5 MG tablet; Take 1 tablet by mouth as needed for Anxiety for up to 30 days. , Disp-15 tablet, R-0Normal  5. Type 2 diabetes mellitus with hyperglycemia, without long-term current use of insulin (HCC)  -     atorvastatin (LIPITOR) 10 MG tablet; Take 1 tablet by mouth daily, Disp-90 tablet, R-1Normal  -     glipiZIDE (GLUCOTROL) 10 MG tablet; Take 2 tabs po twice daily with meals, Disp-360 tablet, R-1Normal  -     lisinopril (PRINIVIL;ZESTRIL) 10 MG tablet; Take 1 tablet by mouth daily, Disp-90 tablet, R-1Normal  -     metFORMIN (GLUCOPHAGE) 1000 MG tablet;  Take 1 tablet by mouth 2 times daily (with meals), Disp-180 tablet, R-1Normal  -     pioglitazone (ACTOS) 45 MG tablet; Take 1 tablet by mouth daily, Disp-90 tablet, R-1Normal  -     blood glucose monitor strips; Check once daily as directed. Dx- Diabetes E11.65, Disp-100 strip, R-3, Normal  -     CBC with Auto Differential; Future  -     Comprehensive Metabolic Panel; Future  -     Lipid Panel; Future  -     Microalbumin / Creatinine Urine Ratio; Future  -     Hemoglobin A1C; Future  6. Psychophysiological insomnia  -     traZODone (DESYREL) 100 MG tablet; Take 1 tablet by mouth nightly Take 2 tabs at HS, Disp-180 tablet, R-1Normal  7. Overweight  8. BMI 28.0-28.9,adult    Await labs. Consider generic injectable insulin if HbA1c is elevated. Advised to use topical ice packs over the right buttock, to use a donut pillow to sit, use topical analgesic ointment, minimize standing, restart Diclofenac bid, start Medrol dose pack with meals from tomorrow, she was given Medrol inj today- discussed possible side effects. Also discussed that she may need X-rays, referral to PT and/ or Orthopedics if she continues to have these episodes. Advised patient to lose weight. Also advised to exercise regularly, to eat healthy foods, and to control portion sizes. I have reviewed the patients controlled substance prescription history, as maintained in the [place] prescription monitoring program, so that the prescription(s) for a  controlled substance can be given. Refilled Lorazepam 0.5 mg for 15 tabs to use prn for severe anxiety. Return for 6 mths- , medication refills, fasting labs or prn sooner. Subjective   SUBJECTIVE/OBJECTIVE:  Medication Refill  Associated symptoms include arthralgias and fatigue. Pertinent negatives include no abdominal pain, chest pain, chills, congestion, coughing, fever, myalgias, nausea, numbness, vomiting or weakness.    Patient comes today with her  for follow up of chronic medical problems. She is compliant with taking all her medicines, denies any side effects, denies any associated symptoms except as below. Diabetes, Overweight-  at last labs, HbA1c was 7.1 in August 2022; fasting sugars per log are 115- 227, 2 hrs PP are 249- 69; feels bad if her sugars are below 90- felt bad when it was 69, has glucose tabs handy. She takes Metformin 1000 mg 1 tab bid, Glipizide 10 mg 2 tabs twice daily with meals, Actos 45 mg in am, (stopped Toujeo 2 units in the past, stopped Jardiance 10 mg due to yeast infections). She also takes Aspirin, Atorvastatin 10 mg, Lisinopril 10mg, fish oil capsule twice a week. Denies any associated symptoms. Her last eye exam was in July 2022- next is in June, has had b/l cataract surgery on 3/29/21 and 4/12/21. She sees a Podiatrist, Dr Jimmy Kay, had surgery in the left foot for inflamed bunion; uses Diabetic shoes, goes every 3 months, next is in March 2023. She is good about watching her sweets and carbohydrates. She has gained about 11 lbs in the last 6 months- says Mile Deras was helping, walks 4 times a week for 15 mins. Restless legs- [she slowly tapered off and stopped Gabapentin (300 mg 1 capsule at lunch and 3 capsules at HS). We tried Requip upto 2 mg- stopped as it was not helping.]  She takes Lyrica 100 mg twice a day, increased Requip to 2 mg to twice a day- doing ok but still bothers her around 3 am on some nights, not bothering her to fall asleep. She stopped using the calf sleeve at bedtime as it was not helping her RLS. Generalized Anxiety and Insomnia- things with her family is settled, her  has multiple medical issues and is not very mobile. She stopped taking Zoloft 50 mg in am as she is not depressed any more; takes Trazodone 100 mg 2 tabs at HS, also stopped Melatonin 10 mg 1 capsule at HS. She sleeps from 9 pm to 6 am, goes to the bathroom in between 1 time a night for 5 mins.  (Tried Doxepin - did not help her sleep at night, felt tired and drowsy the next day, had hallucinations when she took Ambien.) She takes Ativan 1 mg rarely now- requests a refill, not on Oxycodone now. She denies any depression, anxiety, SI/ HI. Says she went to counseling many years ago- did not work, refuses counseling. (Arthritis in hands and low back pain- she hurt her lower back after caring for her  after his back surgery; denies any trauma or surgery to the back. She reports swelling in the joints at the base of her right 2nd, 3rd and 4th fingers and has permanent deformity in the joints with pain in the DIP and PIP joints of the right 2nd, 3rd and 4th fingers- hurt every day. Had right knee replacement on October 9th 2019, left foot surgery 3 weeks ago. She stopped Flexeril 10 mg and Ibuprofen, stopped Meloxicam 7.5 mg daily and is taking Diclofenac bid.)    Right buttock pain and Sciatica- chronic problem with occasional flare ups, ongoing for 3 weeks, reports a knot in the right upper buttock area- comes and goes- since her right knee replacement, denies any precipitating factors; pain radiates into the right groin into the right upper thigh. Denies any tingling, numbness, weakness in the right leg, no limping. She stopped taking Diclofenac 75 mg twice a day as it does not help, took Flexeril which helped a little, used ice packs and heat. (Vit B12 deficiency- she reports a h/o of this 20 years ago, used to take Vit B12 injections then, currently does not take a MVI and is taking an otc Vit B12 every other day- labs have been good; denies fatigue typically.)    GERD-  now takes Omeprazole 40 mg in am but stopped Pepcid 40 mg at HS. She denies reflux symptoms/ indigestion, denies any uncomfortable feeling/ pain in the upper middle abdomen, no heartburn or swallowing difficulty, denies tasting acid in her mouth; denies any blood in stool or dark black stools.  She denies any specific foods that bother her, drinks about 2-3 cups (cut back) of coffee in the morning which are half decaf- now takes with breakfast. She has seen Dr Maire Bosworth- had EGD several years ago. Review of Systems   Constitutional:  Positive for fatigue. Negative for chills and fever. HENT:  Negative for congestion and sinus pain. Respiratory:  Negative for cough, shortness of breath and wheezing. Cardiovascular:  Negative for chest pain, palpitations and leg swelling. Gastrointestinal:  Negative for abdominal pain, diarrhea, nausea and vomiting. Endocrine: Negative for polydipsia, polyphagia and polyuria. Musculoskeletal:  Positive for arthralgias and gait problem. Negative for back pain and myalgias. Neurological:  Negative for weakness and numbness. Psychiatric/Behavioral:  Negative for dysphoric mood, self-injury and sleep disturbance. The patient is nervous/anxious. The patient is not hyperactive. Objective   Physical Exam  Vitals and nursing note reviewed. Constitutional:       General: She is not in acute distress. HENT:      Mouth/Throat:      Mouth: Mucous membranes are moist.      Pharynx: Oropharynx is clear. Eyes:      Conjunctiva/sclera: Conjunctivae normal.      Pupils: Pupils are equal, round, and reactive to light. Cardiovascular:      Rate and Rhythm: Normal rate and regular rhythm. Pulmonary:      Effort: Pulmonary effort is normal.      Breath sounds: Normal breath sounds. Abdominal:      General: Bowel sounds are normal.      Palpations: Abdomen is soft. Tenderness: There is no abdominal tenderness. Musculoskeletal:      Cervical back: Neck supple. No tenderness. Right lower leg: No edema. Left lower leg: No edema. Comments: Non tender over lumbar spine, sacroiliac joints or paraspinal muscles; no obvious muscle spasm, tender over right mid buttock area, SLRs are negative b/l   Neurological:      Mental Status: She is alert. Motor: No weakness. Gait: Gait abnormal (limping slightly).       Deep Tendon Reflexes: Reflexes normal.      Comments: Good muscle tone, power 5/5, DTRs are normal in the right LE   Psychiatric:      Comments: Minimally anxious, good eye contact                An electronic signature was used to authenticate this note.     --Jamal Whaley MD

## 2023-04-08 DIAGNOSIS — F51.04 PSYCHOPHYSIOLOGICAL INSOMNIA: ICD-10-CM

## 2023-04-08 DIAGNOSIS — F41.1 GENERALIZED ANXIETY DISORDER: ICD-10-CM

## 2023-04-10 RX ORDER — TRAZODONE HYDROCHLORIDE 100 MG/1
TABLET ORAL
Qty: 180 TABLET | Refills: 1 | OUTPATIENT
Start: 2023-04-10

## 2023-04-14 ENCOUNTER — TELEPHONE (OUTPATIENT)
Dept: FAMILY MEDICINE CLINIC | Facility: CLINIC | Age: 78
End: 2023-04-14

## 2023-04-20 ENCOUNTER — OFFICE VISIT (OUTPATIENT)
Dept: FAMILY MEDICINE CLINIC | Facility: CLINIC | Age: 78
End: 2023-04-20
Payer: MEDICARE

## 2023-04-20 VITALS
SYSTOLIC BLOOD PRESSURE: 116 MMHG | BODY MASS INDEX: 29.08 KG/M2 | TEMPERATURE: 97.1 F | HEIGHT: 62 IN | DIASTOLIC BLOOD PRESSURE: 62 MMHG | RESPIRATION RATE: 16 BRPM | HEART RATE: 72 BPM | OXYGEN SATURATION: 97 % | WEIGHT: 158 LBS

## 2023-04-20 DIAGNOSIS — M79.604 ACUTE PAIN OF RIGHT LOWER EXTREMITY: Primary | ICD-10-CM

## 2023-04-20 PROCEDURE — 1036F TOBACCO NON-USER: CPT | Performed by: FAMILY MEDICINE

## 2023-04-20 PROCEDURE — G8417 CALC BMI ABV UP PARAM F/U: HCPCS | Performed by: FAMILY MEDICINE

## 2023-04-20 PROCEDURE — 1123F ACP DISCUSS/DSCN MKR DOCD: CPT | Performed by: FAMILY MEDICINE

## 2023-04-20 PROCEDURE — 1090F PRES/ABSN URINE INCON ASSESS: CPT | Performed by: FAMILY MEDICINE

## 2023-04-20 PROCEDURE — G8399 PT W/DXA RESULTS DOCUMENT: HCPCS | Performed by: FAMILY MEDICINE

## 2023-04-20 PROCEDURE — 99213 OFFICE O/P EST LOW 20 MIN: CPT | Performed by: FAMILY MEDICINE

## 2023-04-20 PROCEDURE — G8427 DOCREV CUR MEDS BY ELIG CLIN: HCPCS | Performed by: FAMILY MEDICINE

## 2023-04-20 SDOH — ECONOMIC STABILITY: INCOME INSECURITY: HOW HARD IS IT FOR YOU TO PAY FOR THE VERY BASICS LIKE FOOD, HOUSING, MEDICAL CARE, AND HEATING?: NOT HARD AT ALL

## 2023-04-20 SDOH — ECONOMIC STABILITY: FOOD INSECURITY: WITHIN THE PAST 12 MONTHS, THE FOOD YOU BOUGHT JUST DIDN'T LAST AND YOU DIDN'T HAVE MONEY TO GET MORE.: NEVER TRUE

## 2023-04-20 SDOH — ECONOMIC STABILITY: FOOD INSECURITY: WITHIN THE PAST 12 MONTHS, YOU WORRIED THAT YOUR FOOD WOULD RUN OUT BEFORE YOU GOT MONEY TO BUY MORE.: NEVER TRUE

## 2023-04-20 ASSESSMENT — ENCOUNTER SYMPTOMS
VOMITING: 0
ABDOMINAL PAIN: 0
WHEEZING: 0
SORE THROAT: 0
DIARRHEA: 0
COUGH: 0
NAUSEA: 0
SHORTNESS OF BREATH: 0

## 2023-04-20 ASSESSMENT — PATIENT HEALTH QUESTIONNAIRE - PHQ9
SUM OF ALL RESPONSES TO PHQ9 QUESTIONS 1 & 2: 0
2. FEELING DOWN, DEPRESSED OR HOPELESS: 0
SUM OF ALL RESPONSES TO PHQ QUESTIONS 1-9: 0
1. LITTLE INTEREST OR PLEASURE IN DOING THINGS: 0

## 2023-04-20 NOTE — PROGRESS NOTES
Vito Nicholson (:  1945) is a [de-identified] y.o. female,Established patient, here for evaluation of the following chief complaint(s):  Leg Pain         ASSESSMENT/PLAN:  1. Acute pain of right lower extremity    Symptoms have now resolved, differential diagnosis could be Sciatica or circulatory issues. Discussed doing an US doppler of the leg but we both agreed that it may not yield anything since her symptoms have resolved. Advised patient to schedule an appointment if she develops symptoms again. Return if symptoms worsen or fail to improve. Subjective   SUBJECTIVE/OBJECTIVE:  Leg Pain       Patient comes today to discuss right leg pain that started about 3-4 weeks ago- denies any precipitating factors. Says she was in tears from pain, was dragging the right leg to walk; denies any worsening of pain with walking. She reports pain in the right leg from her ankle up to the right hip- stopped hurting 3-4 days ago; sometimes starts to hurt in the evening for a little bit. She would feel a gush of fluid from the ankle up to her hip on the right side- occurred 2-3 times a day, lasted 1 second. Denies any swelling in the legs, erythema, cord like structures; has chronic right knee pain and RLS- takes medication for it; also has chronic low back pain. She saw her Podiatrist and was told her circulation in her right leg was lower than the left side at the ankles (based on ? Doppler). She took Diclofenac and Tylenol twice a day and occasionally Baclofen. Today she denies any pain or any other symptoms. Review of Systems   Constitutional:  Negative for chills and fever. HENT:  Negative for ear pain and sore throat. Respiratory:  Negative for cough, shortness of breath and wheezing. Cardiovascular:  Negative for chest pain, palpitations and leg swelling. Gastrointestinal:  Negative for abdominal pain, diarrhea, nausea and vomiting.    Musculoskeletal:  Positive for arthralgias (chronic right knee

## 2023-04-25 DIAGNOSIS — E11.65 TYPE 2 DIABETES MELLITUS WITH HYPERGLYCEMIA, WITHOUT LONG-TERM CURRENT USE OF INSULIN (HCC): ICD-10-CM

## 2023-04-25 RX ORDER — GLIPIZIDE 10 MG/1
TABLET ORAL
Qty: 360 TABLET | Refills: 1 | OUTPATIENT
Start: 2023-04-25

## 2023-06-09 ENCOUNTER — OFFICE VISIT (OUTPATIENT)
Dept: FAMILY MEDICINE CLINIC | Facility: CLINIC | Age: 78
End: 2023-06-09
Payer: MEDICARE

## 2023-06-09 VITALS
HEIGHT: 62 IN | RESPIRATION RATE: 18 BRPM | SYSTOLIC BLOOD PRESSURE: 122 MMHG | OXYGEN SATURATION: 96 % | DIASTOLIC BLOOD PRESSURE: 68 MMHG | WEIGHT: 158 LBS | TEMPERATURE: 97 F | BODY MASS INDEX: 29.08 KG/M2 | HEART RATE: 82 BPM

## 2023-06-09 DIAGNOSIS — M25.551 CHRONIC RIGHT HIP PAIN: Primary | ICD-10-CM

## 2023-06-09 DIAGNOSIS — G89.29 CHRONIC RIGHT HIP PAIN: Primary | ICD-10-CM

## 2023-06-09 PROCEDURE — 1090F PRES/ABSN URINE INCON ASSESS: CPT | Performed by: NURSE PRACTITIONER

## 2023-06-09 PROCEDURE — G8399 PT W/DXA RESULTS DOCUMENT: HCPCS | Performed by: NURSE PRACTITIONER

## 2023-06-09 PROCEDURE — G8428 CUR MEDS NOT DOCUMENT: HCPCS | Performed by: NURSE PRACTITIONER

## 2023-06-09 PROCEDURE — 99213 OFFICE O/P EST LOW 20 MIN: CPT | Performed by: NURSE PRACTITIONER

## 2023-06-09 PROCEDURE — 1123F ACP DISCUSS/DSCN MKR DOCD: CPT | Performed by: NURSE PRACTITIONER

## 2023-06-09 PROCEDURE — 96372 THER/PROPH/DIAG INJ SC/IM: CPT | Performed by: NURSE PRACTITIONER

## 2023-06-09 PROCEDURE — G8417 CALC BMI ABV UP PARAM F/U: HCPCS | Performed by: NURSE PRACTITIONER

## 2023-06-09 PROCEDURE — 1036F TOBACCO NON-USER: CPT | Performed by: NURSE PRACTITIONER

## 2023-06-09 RX ORDER — DEXAMETHASONE SODIUM PHOSPHATE 10 MG/ML
10 INJECTION INTRAMUSCULAR; INTRAVENOUS ONCE
Status: COMPLETED | OUTPATIENT
Start: 2023-06-09 | End: 2023-06-09

## 2023-06-09 RX ORDER — TRIAMCINOLONE ACETONIDE 40 MG/ML
60 INJECTION, SUSPENSION INTRA-ARTICULAR; INTRAMUSCULAR ONCE
Status: COMPLETED | OUTPATIENT
Start: 2023-06-09 | End: 2023-06-09

## 2023-06-09 RX ORDER — DEXAMETHASONE SODIUM PHOSPHATE 10 MG/ML
10 INJECTION INTRAMUSCULAR; INTRAVENOUS EVERY 6 HOURS
Status: DISCONTINUED | OUTPATIENT
Start: 2023-06-09 | End: 2023-06-09

## 2023-06-09 RX ADMIN — DEXAMETHASONE SODIUM PHOSPHATE 10 MG: 10 INJECTION INTRAMUSCULAR; INTRAVENOUS at 09:45

## 2023-06-09 RX ADMIN — TRIAMCINOLONE ACETONIDE 60 MG: 40 INJECTION, SUSPENSION INTRA-ARTICULAR; INTRAMUSCULAR at 09:47

## 2023-06-09 SDOH — ECONOMIC STABILITY: FOOD INSECURITY: WITHIN THE PAST 12 MONTHS, THE FOOD YOU BOUGHT JUST DIDN'T LAST AND YOU DIDN'T HAVE MONEY TO GET MORE.: NEVER TRUE

## 2023-06-09 SDOH — ECONOMIC STABILITY: FOOD INSECURITY: WITHIN THE PAST 12 MONTHS, YOU WORRIED THAT YOUR FOOD WOULD RUN OUT BEFORE YOU GOT MONEY TO BUY MORE.: NEVER TRUE

## 2023-06-09 SDOH — ECONOMIC STABILITY: INCOME INSECURITY: HOW HARD IS IT FOR YOU TO PAY FOR THE VERY BASICS LIKE FOOD, HOUSING, MEDICAL CARE, AND HEATING?: SOMEWHAT HARD

## 2023-06-09 ASSESSMENT — ENCOUNTER SYMPTOMS
DIARRHEA: 0
RHINORRHEA: 0
BLOOD IN STOOL: 0
CONSTIPATION: 0
VOMITING: 0
WHEEZING: 0
EYE DISCHARGE: 0
SHORTNESS OF BREATH: 0
CHEST TIGHTNESS: 0
COUGH: 0
EYE PAIN: 0
ABDOMINAL PAIN: 0

## 2023-06-09 ASSESSMENT — PATIENT HEALTH QUESTIONNAIRE - PHQ9
SUM OF ALL RESPONSES TO PHQ QUESTIONS 1-9: 0
SUM OF ALL RESPONSES TO PHQ QUESTIONS 1-9: 0
1. LITTLE INTEREST OR PLEASURE IN DOING THINGS: 0
SUM OF ALL RESPONSES TO PHQ9 QUESTIONS 1 & 2: 0
2. FEELING DOWN, DEPRESSED OR HOPELESS: 0
SUM OF ALL RESPONSES TO PHQ QUESTIONS 1-9: 0
SUM OF ALL RESPONSES TO PHQ QUESTIONS 1-9: 0

## 2023-06-09 NOTE — PROGRESS NOTES
Ziyad Dhillon (:  1945) is a [de-identified] y.o. female,Established patient, here for evaluation of the following chief complaint(s):  Leg Pain (Right leg pain )         ASSESSMENT/PLAN:  1. Chronic right hip pain  -     triamcinolone acetonide (KENALOG-40) injection 60 mg; 60 mg, IntraMUSCular, ONCE, 1 dose, On 23 at 0945  -     dexamethasone (DECADRON) injection 10 mg; 10 mg, IntraMUSCular, ONCE, 1 dose, On 23 at 1015  Will do injection. If not better in 2-3 days, will add medrol dose pack. CBD oil and cooling cream not helping. Recommended Diclofenac gel 3-4 times daily instead. Sample given to patient today in office. Return in about 5 years (around 2028), or if symptoms worsen or fail to improve. Subjective   SUBJECTIVE/OBJECTIVE:  Hip and leg bothering. Has bothering her for a long time. Got an injection for her last visit which helped. Is very active, likes working in the yard. Laid out mulch prior to her hip bothering her. .  Blood sugar 137 this morning. Did not remember her blood sugars going up when she was on steroids before. Cares for her  who is disabled. Hip Pain   The incident occurred more than 1 week ago. The incident occurred at home. Injury mechanism: overuse injury. Pain location: right hip and leg. The quality of the pain is described as aching and burning. The pain is moderate. The pain has been Constant since onset. Associated symptoms include numbness and tingling. Pertinent negatives include no muscle weakness. The symptoms are aggravated by movement. Treatments tried: OTC CBD oil and cooling cream. The treatment provided no relief.      No Known Allergies  Current Outpatient Medications   Medication Sig    atorvastatin (LIPITOR) 10 MG tablet Take 1 tablet by mouth daily    diclofenac (VOLTAREN) 75 MG EC tablet Take 1 tablet by mouth 2 times daily    glipiZIDE (GLUCOTROL) 10 MG tablet Take 2 tabs po twice daily with meals

## 2023-07-29 DIAGNOSIS — G25.81 RESTLESS LEGS SYNDROME: ICD-10-CM

## 2023-07-31 RX ORDER — ROPINIROLE 2 MG/1
TABLET, FILM COATED ORAL
Qty: 180 TABLET | Refills: 1 | OUTPATIENT
Start: 2023-07-31

## 2023-08-02 ENCOUNTER — OFFICE VISIT (OUTPATIENT)
Dept: FAMILY MEDICINE CLINIC | Facility: CLINIC | Age: 78
End: 2023-08-02
Payer: MEDICARE

## 2023-08-02 VITALS
TEMPERATURE: 97.2 F | HEIGHT: 62 IN | HEART RATE: 76 BPM | OXYGEN SATURATION: 95 % | RESPIRATION RATE: 16 BRPM | SYSTOLIC BLOOD PRESSURE: 112 MMHG | BODY MASS INDEX: 28.52 KG/M2 | WEIGHT: 155 LBS | DIASTOLIC BLOOD PRESSURE: 62 MMHG

## 2023-08-02 DIAGNOSIS — E11.65 TYPE 2 DIABETES MELLITUS WITH HYPERGLYCEMIA, WITHOUT LONG-TERM CURRENT USE OF INSULIN (HCC): ICD-10-CM

## 2023-08-02 DIAGNOSIS — M54.41 CHRONIC RIGHT-SIDED LOW BACK PAIN WITH RIGHT-SIDED SCIATICA: ICD-10-CM

## 2023-08-02 DIAGNOSIS — K21.9 GASTROESOPHAGEAL REFLUX DISEASE WITHOUT ESOPHAGITIS: ICD-10-CM

## 2023-08-02 DIAGNOSIS — G89.29 CHRONIC RIGHT-SIDED LOW BACK PAIN WITH RIGHT-SIDED SCIATICA: ICD-10-CM

## 2023-08-02 DIAGNOSIS — G25.81 RESTLESS LEGS SYNDROME: ICD-10-CM

## 2023-08-02 DIAGNOSIS — F51.04 PSYCHOPHYSIOLOGICAL INSOMNIA: ICD-10-CM

## 2023-08-02 DIAGNOSIS — E11.65 TYPE 2 DIABETES MELLITUS WITH HYPERGLYCEMIA, WITHOUT LONG-TERM CURRENT USE OF INSULIN (HCC): Primary | ICD-10-CM

## 2023-08-02 DIAGNOSIS — F41.1 GENERALIZED ANXIETY DISORDER: ICD-10-CM

## 2023-08-02 LAB
ALBUMIN SERPL-MCNC: 3.8 G/DL (ref 3.2–4.6)
ALBUMIN/GLOB SERPL: 1.3 (ref 0.4–1.6)
ALP SERPL-CCNC: 51 U/L (ref 50–136)
ALT SERPL-CCNC: 28 U/L (ref 12–65)
ANION GAP SERPL CALC-SCNC: 4 MMOL/L (ref 2–11)
AST SERPL-CCNC: 14 U/L (ref 15–37)
BASOPHILS # BLD: 0.1 K/UL (ref 0–0.2)
BASOPHILS NFR BLD: 1 % (ref 0–2)
BILIRUB SERPL-MCNC: 0.4 MG/DL (ref 0.2–1.1)
BUN SERPL-MCNC: 12 MG/DL (ref 8–23)
CALCIUM SERPL-MCNC: 8.9 MG/DL (ref 8.3–10.4)
CHLORIDE SERPL-SCNC: 107 MMOL/L (ref 101–110)
CHOLEST SERPL-MCNC: 110 MG/DL
CO2 SERPL-SCNC: 27 MMOL/L (ref 21–32)
CREAT SERPL-MCNC: 0.7 MG/DL (ref 0.6–1)
CREAT UR-MCNC: 107 MG/DL
DIFFERENTIAL METHOD BLD: ABNORMAL
EOSINOPHIL # BLD: 0 K/UL (ref 0–0.8)
EOSINOPHIL NFR BLD: 0 % (ref 0.5–7.8)
ERYTHROCYTE [DISTWIDTH] IN BLOOD BY AUTOMATED COUNT: 14.6 % (ref 11.9–14.6)
GLOBULIN SER CALC-MCNC: 3 G/DL (ref 2.8–4.5)
GLUCOSE SERPL-MCNC: 227 MG/DL (ref 65–100)
HCT VFR BLD AUTO: 34.5 % (ref 35.8–46.3)
HDLC SERPL-MCNC: 65 MG/DL (ref 40–60)
HDLC SERPL: 1.7
HGB BLD-MCNC: 11 G/DL (ref 11.7–15.4)
IMM GRANULOCYTES # BLD AUTO: 0 K/UL (ref 0–0.5)
IMM GRANULOCYTES NFR BLD AUTO: 0 % (ref 0–5)
LDLC SERPL CALC-MCNC: 33.4 MG/DL
LYMPHOCYTES # BLD: 1.2 K/UL (ref 0.5–4.6)
LYMPHOCYTES NFR BLD: 25 % (ref 13–44)
MCH RBC QN AUTO: 32.2 PG (ref 26.1–32.9)
MCHC RBC AUTO-ENTMCNC: 31.9 G/DL (ref 31.4–35)
MCV RBC AUTO: 100.9 FL (ref 82–102)
MICROALBUMIN UR-MCNC: 1.78 MG/DL
MICROALBUMIN/CREAT UR-RTO: 17 MG/G (ref 0–30)
MONOCYTES # BLD: 0.4 K/UL (ref 0.1–1.3)
MONOCYTES NFR BLD: 8 % (ref 4–12)
NEUTS SEG # BLD: 3.2 K/UL (ref 1.7–8.2)
NEUTS SEG NFR BLD: 66 % (ref 43–78)
NRBC # BLD: 0 K/UL (ref 0–0.2)
PLATELET # BLD AUTO: 234 K/UL (ref 150–450)
PMV BLD AUTO: 10.6 FL (ref 9.4–12.3)
POTASSIUM SERPL-SCNC: 4.5 MMOL/L (ref 3.5–5.1)
PROT SERPL-MCNC: 6.8 G/DL (ref 6.3–8.2)
RBC # BLD AUTO: 3.42 M/UL (ref 4.05–5.2)
SODIUM SERPL-SCNC: 138 MMOL/L (ref 133–143)
TRIGL SERPL-MCNC: 58 MG/DL (ref 35–150)
VLDLC SERPL CALC-MCNC: 11.6 MG/DL (ref 6–23)
WBC # BLD AUTO: 4.9 K/UL (ref 4.3–11.1)

## 2023-08-02 PROCEDURE — 99215 OFFICE O/P EST HI 40 MIN: CPT | Performed by: FAMILY MEDICINE

## 2023-08-02 PROCEDURE — 1123F ACP DISCUSS/DSCN MKR DOCD: CPT | Performed by: FAMILY MEDICINE

## 2023-08-02 PROCEDURE — G8427 DOCREV CUR MEDS BY ELIG CLIN: HCPCS | Performed by: FAMILY MEDICINE

## 2023-08-02 PROCEDURE — G8417 CALC BMI ABV UP PARAM F/U: HCPCS | Performed by: FAMILY MEDICINE

## 2023-08-02 PROCEDURE — 3051F HG A1C>EQUAL 7.0%<8.0%: CPT | Performed by: FAMILY MEDICINE

## 2023-08-02 PROCEDURE — 1090F PRES/ABSN URINE INCON ASSESS: CPT | Performed by: FAMILY MEDICINE

## 2023-08-02 PROCEDURE — 1036F TOBACCO NON-USER: CPT | Performed by: FAMILY MEDICINE

## 2023-08-02 PROCEDURE — G8399 PT W/DXA RESULTS DOCUMENT: HCPCS | Performed by: FAMILY MEDICINE

## 2023-08-02 RX ORDER — GLUCOSAMINE HCL/CHONDROITIN SU 500-400 MG
CAPSULE ORAL
Qty: 100 STRIP | Refills: 3 | Status: SHIPPED | OUTPATIENT
Start: 2023-08-02

## 2023-08-02 RX ORDER — ATORVASTATIN CALCIUM 10 MG/1
10 TABLET, FILM COATED ORAL DAILY
Qty: 90 TABLET | Refills: 1 | Status: SHIPPED | OUTPATIENT
Start: 2023-08-02

## 2023-08-02 RX ORDER — DICLOFENAC SODIUM 75 MG/1
75 TABLET, DELAYED RELEASE ORAL 2 TIMES DAILY
Qty: 60 TABLET | Refills: 0 | Status: SHIPPED | OUTPATIENT
Start: 2023-08-02

## 2023-08-02 RX ORDER — PIOGLITAZONEHYDROCHLORIDE 45 MG/1
45 TABLET ORAL DAILY
Qty: 90 TABLET | Refills: 1 | Status: SHIPPED | OUTPATIENT
Start: 2023-08-02

## 2023-08-02 RX ORDER — LORAZEPAM 0.5 MG/1
0.5 TABLET ORAL PRN
Qty: 15 TABLET | Refills: 0 | Status: SHIPPED | OUTPATIENT
Start: 2023-08-02 | End: 2023-10-31

## 2023-08-02 RX ORDER — ORAL SEMAGLUTIDE 3 MG/1
3 TABLET ORAL
Qty: 30 TABLET | Refills: 0 | Status: SHIPPED | OUTPATIENT
Start: 2023-08-02 | End: 2023-09-01

## 2023-08-02 RX ORDER — BACLOFEN 10 MG/1
10 TABLET ORAL
Qty: 15 TABLET | Refills: 0 | Status: SHIPPED | OUTPATIENT
Start: 2023-08-02

## 2023-08-02 RX ORDER — TRAZODONE HYDROCHLORIDE 100 MG/1
100 TABLET ORAL NIGHTLY
Qty: 180 TABLET | Refills: 1 | Status: SHIPPED | OUTPATIENT
Start: 2023-08-02

## 2023-08-02 RX ORDER — GLIPIZIDE 10 MG/1
TABLET ORAL
Qty: 360 TABLET | Refills: 1 | Status: SHIPPED | OUTPATIENT
Start: 2023-08-02

## 2023-08-02 RX ORDER — OMEPRAZOLE 40 MG/1
40 CAPSULE, DELAYED RELEASE ORAL DAILY
Qty: 90 CAPSULE | Refills: 1 | Status: SHIPPED | OUTPATIENT
Start: 2023-08-02

## 2023-08-02 RX ORDER — LISINOPRIL 10 MG/1
10 TABLET ORAL DAILY
Qty: 90 TABLET | Refills: 1 | Status: SHIPPED | OUTPATIENT
Start: 2023-08-02

## 2023-08-02 RX ORDER — PREGABALIN 100 MG/1
100 CAPSULE ORAL 2 TIMES DAILY
Qty: 180 CAPSULE | Refills: 1 | Status: SHIPPED | OUTPATIENT
Start: 2023-08-02 | End: 2024-01-29

## 2023-08-02 RX ORDER — ROPINIROLE 2 MG/1
2 TABLET, FILM COATED ORAL 2 TIMES DAILY
Qty: 180 TABLET | Refills: 1 | Status: SHIPPED | OUTPATIENT
Start: 2023-08-02

## 2023-08-02 ASSESSMENT — PATIENT HEALTH QUESTIONNAIRE - PHQ9
SUM OF ALL RESPONSES TO PHQ QUESTIONS 1-9: 0
SUM OF ALL RESPONSES TO PHQ QUESTIONS 1-9: 0
SUM OF ALL RESPONSES TO PHQ9 QUESTIONS 1 & 2: 0
SUM OF ALL RESPONSES TO PHQ QUESTIONS 1-9: 0
SUM OF ALL RESPONSES TO PHQ QUESTIONS 1-9: 0
1. LITTLE INTEREST OR PLEASURE IN DOING THINGS: 0
2. FEELING DOWN, DEPRESSED OR HOPELESS: 0

## 2023-08-02 ASSESSMENT — ENCOUNTER SYMPTOMS
SINUS PAIN: 0
PHOTOPHOBIA: 0
VOMITING: 0
WHEEZING: 0
ABDOMINAL DISTENTION: 0
COUGH: 0
NAUSEA: 0
SHORTNESS OF BREATH: 0
DIARRHEA: 0
ABDOMINAL PAIN: 0
BACK PAIN: 0

## 2023-08-02 NOTE — PROGRESS NOTES
back) of coffee in the morning which are mostly  decaf- now takes with breakfast. She has seen Dr Waylon Hodgson- had EGD several years ago. Review of Systems   Constitutional:  Positive for fatigue. Negative for chills and fever. HENT:  Negative for congestion and sinus pain. Eyes:  Negative for photophobia and visual disturbance. Respiratory:  Negative for cough, shortness of breath and wheezing. Cardiovascular:  Negative for chest pain, palpitations and leg swelling. Gastrointestinal:  Negative for abdominal distention, abdominal pain, diarrhea, nausea and vomiting. Endocrine: Negative for polydipsia, polyphagia and polyuria. Musculoskeletal:  Positive for arthralgias and gait problem. Negative for back pain and myalgias. Neurological:  Negative for weakness and numbness. Psychiatric/Behavioral:  Positive for sleep disturbance. Negative for dysphoric mood, self-injury and suicidal ideas. The patient is nervous/anxious. The patient is not hyperactive. Objective   Physical Exam  Vitals and nursing note reviewed. Constitutional:       General: She is not in acute distress. HENT:      Mouth/Throat:      Mouth: Mucous membranes are moist.      Pharynx: Oropharynx is clear. Eyes:      Conjunctiva/sclera: Conjunctivae normal.      Pupils: Pupils are equal, round, and reactive to light. Cardiovascular:      Rate and Rhythm: Normal rate and regular rhythm. Pulmonary:      Effort: Pulmonary effort is normal.      Breath sounds: Normal breath sounds. Chest:      Chest wall: No tenderness. Abdominal:      General: Bowel sounds are normal. There is no distension. Palpations: Abdomen is soft. Tenderness: There is no abdominal tenderness. Musculoskeletal:      Cervical back: Neck supple. Right lower leg: No edema. Left lower leg: No edema.       Comments: Non tender over lumbar spine, sacroiliac joints or paraspinal muscles; no obvious muscle spasm, tender over right mid

## 2023-08-03 LAB
EST. AVERAGE GLUCOSE BLD GHB EST-MCNC: 194 MG/DL
HBA1C MFR BLD: 8.4 % (ref 4.8–5.6)

## 2023-08-11 ENCOUNTER — OFFICE VISIT (OUTPATIENT)
Dept: ORTHOPEDIC SURGERY | Age: 78
End: 2023-08-11

## 2023-08-11 VITALS — BODY MASS INDEX: 28.52 KG/M2 | WEIGHT: 155 LBS | HEIGHT: 62 IN

## 2023-08-11 DIAGNOSIS — M51.36 DDD (DEGENERATIVE DISC DISEASE), LUMBAR: ICD-10-CM

## 2023-08-11 DIAGNOSIS — M47.816 LUMBAR SPONDYLOSIS: ICD-10-CM

## 2023-08-11 DIAGNOSIS — M54.16 LUMBAR RADICULOPATHY: Primary | ICD-10-CM

## 2023-08-11 RX ORDER — TIZANIDINE 2 MG/1
2 TABLET ORAL 2 TIMES DAILY
Qty: 60 TABLET | Refills: 2 | Status: SHIPPED | OUTPATIENT
Start: 2023-08-11

## 2023-08-11 NOTE — PROGRESS NOTES
Name: Chaya Maurer  YOB: 1945  Gender: female  MRN: [id number]    CC:   Chief Complaint   Patient presents with    New Patient     Pt is here today for lumbar pain that goes down the right side of her leg         HPI:   Chaya Maurer is a 78 y.o. female with a PMHx of diabetes with last A1c around 8, other comorbidities listed below. They present here for evaluation of back pain rating to the right leg and foot. Patient also has numbness and tingling sensation to the right leg. This is been going on for months at least.  She has been taking a muscle relaxer, Voltaren, Lyrica and as well has had a steroid shot given by her primary care provider which provided minimal relief. She does not feel the baclofen is helping her very much either. Her symptoms are worse with standing and walking. Pain Scale: 8/10  ADL's affected: Cleaning and maintaining her home  Conservative treatment attempted: Supervised Home exercise program for the last 6 weeks, muscle relaxer, voltaren          Past Medical History Includes:   Past Medical History:   Diagnosis Date    DJD (degenerative joint disease)     GERD (gastroesophageal reflux disease)     Insomnia     Osteoporosis     Type II or unspecified type diabetes mellitus without mention of complication, not stated as uncontrolled    ,   Past Surgical History:   Procedure Laterality Date    KNEE ARTHROSCOPY Bilateral 2004    OTHER SURGICAL HISTORY Right 2018    right knee replacement    PARTIAL HYSTERECTOMY (CERVIX NOT REMOVED)  1973    SALPINGO-OOPHORECTOMY  1994    THYROIDECTOMY, PARTIAL       Family History:   Family History   Problem Relation Age of Onset    Heart Disease Paternal Uncle 54    Heart Disease Father         got killed in WW-2 on D-day      Social History:   Social History     Tobacco Use    Smoking status: Never    Smokeless tobacco: Never   Substance Use Topics    Alcohol use:  Yes       ALLERGIES: No Known Allergies     Patient

## 2023-08-30 DIAGNOSIS — E11.65 TYPE 2 DIABETES MELLITUS WITH HYPERGLYCEMIA, WITHOUT LONG-TERM CURRENT USE OF INSULIN (HCC): ICD-10-CM

## 2023-08-30 RX ORDER — ORAL SEMAGLUTIDE 3 MG/1
TABLET ORAL
Qty: 30 TABLET | Refills: 0 | OUTPATIENT
Start: 2023-08-30

## 2023-08-31 ENCOUNTER — OFFICE VISIT (OUTPATIENT)
Dept: ORTHOPEDIC SURGERY | Age: 78
End: 2023-08-31

## 2023-08-31 DIAGNOSIS — M54.16 LUMBAR RADICULOPATHY: Primary | ICD-10-CM

## 2023-08-31 DIAGNOSIS — M47.816 LUMBAR SPONDYLOSIS: ICD-10-CM

## 2023-08-31 DIAGNOSIS — M51.36 DDD (DEGENERATIVE DISC DISEASE), LUMBAR: ICD-10-CM

## 2023-08-31 RX ORDER — TRIAMCINOLONE ACETONIDE 40 MG/ML
40 INJECTION, SUSPENSION INTRA-ARTICULAR; INTRAMUSCULAR ONCE
Status: COMPLETED | OUTPATIENT
Start: 2023-08-31 | End: 2023-08-31

## 2023-08-31 RX ADMIN — TRIAMCINOLONE ACETONIDE 40 MG: 40 INJECTION, SUSPENSION INTRA-ARTICULAR; INTRAMUSCULAR at 15:01

## 2023-08-31 NOTE — PROGRESS NOTES
Name: Tyree Conrad  YOB: 1945  Gender: female  MRN: [id number]        Transforaminal ANNA Procedure Note    Procedure: Right  L5-S1 transforaminal epidural steroid injections     Precautions: Tyree Conrad denies prior sensitivity to steroid, local anesthetic, iodine, or shellfish. Consent:  Consent was obtained prior to the procedure. The procedure was discussed at length with Tyree Conrad. She was given the opportunity to ask questions regarding the procedure and its associated risks. In addition to the potential risks associated with the procedure itself, the patient was informed both verbally and in writing of potential side effects of the use glucocorticoids. The patient appeared to comprehend the informed consent and desired to have the procedure performed, and informed consent was signed. She was placed in a prone position on the fluoroscopy table and the skin was prepped and draped in a routine sterile fashion. The areas to be injected were each anesthetized with 1 ml of 1% Lidocaine. A 22 gauge 3.5 inch spinal needle was carefully advanced under fluoroscopic guidance to the right L5-S1 transforaminal space  0.5 ml of 70% of Omnipaque was injected to confirm proper needle placement and absence of subdural or vascular flow Once proper placement was confirmed, 0.5ml of 0.25 marcaine and 40 mg kenalog were injected through the spinal needle. Fluoroscopic guidance was used intermittently over a 10-minute period to insure proper needle placement and her safety. A hard copy of the fluoroscopic image has been placed in her chart and is saved on the C-arm hard drive. She was monitored for 30 minutes after the procedure and discharged home in a stable fashion with a routine follow up. Procedural Diagnosis:     ICD-10-CM    1. Lumbar radiculopathy  M54.16 FL NERVE BLOCK LUMBOSACRAL 1ST     triamcinolone acetonide (KENALOG-40) injection 40 mg      2.

## 2023-09-05 ENCOUNTER — OFFICE VISIT (OUTPATIENT)
Dept: FAMILY MEDICINE CLINIC | Facility: CLINIC | Age: 78
End: 2023-09-05
Payer: MEDICARE

## 2023-09-05 VITALS
OXYGEN SATURATION: 98 % | RESPIRATION RATE: 16 BRPM | SYSTOLIC BLOOD PRESSURE: 120 MMHG | BODY MASS INDEX: 28.16 KG/M2 | DIASTOLIC BLOOD PRESSURE: 74 MMHG | HEIGHT: 62 IN | TEMPERATURE: 99.5 F | HEART RATE: 97 BPM | WEIGHT: 153 LBS

## 2023-09-05 DIAGNOSIS — U07.1 COVID-19: ICD-10-CM

## 2023-09-05 DIAGNOSIS — E11.65 TYPE 2 DIABETES MELLITUS WITH HYPERGLYCEMIA, WITHOUT LONG-TERM CURRENT USE OF INSULIN (HCC): Primary | ICD-10-CM

## 2023-09-05 LAB
EXP DATE SOLUTION: ABNORMAL
EXP DATE SWAB: ABNORMAL
EXPIRATION DATE: ABNORMAL
LOT NUMBER POC: ABNORMAL
LOT NUMBER SOLUTION: ABNORMAL
LOT NUMBER SWAB: ABNORMAL
SARS-COV-2 RNA, POC: POSITIVE

## 2023-09-05 PROCEDURE — 87635 SARS-COV-2 COVID-19 AMP PRB: CPT | Performed by: FAMILY MEDICINE

## 2023-09-05 PROCEDURE — 99443 PR PHYS/QHP TELEPHONE EVALUATION 21-30 MIN: CPT | Performed by: FAMILY MEDICINE

## 2023-09-05 ASSESSMENT — PATIENT HEALTH QUESTIONNAIRE - PHQ9
SUM OF ALL RESPONSES TO PHQ QUESTIONS 1-9: 0
2. FEELING DOWN, DEPRESSED OR HOPELESS: 0
1. LITTLE INTEREST OR PLEASURE IN DOING THINGS: 0
SUM OF ALL RESPONSES TO PHQ QUESTIONS 1-9: 0
SUM OF ALL RESPONSES TO PHQ9 QUESTIONS 1 & 2: 0

## 2023-09-05 ASSESSMENT — ENCOUNTER SYMPTOMS
SHORTNESS OF BREATH: 0
ABDOMINAL DISTENTION: 0
SORE THROAT: 0
EYE DISCHARGE: 0
ABDOMINAL PAIN: 0
SINUS PAIN: 0
VOMITING: 0
WHEEZING: 0
VOICE CHANGE: 0
COUGH: 1
CHOKING: 0
DIARRHEA: 0
EYE ITCHING: 0
EYE REDNESS: 0
NAUSEA: 0
EYE PAIN: 0
SINUS PRESSURE: 0

## 2023-09-05 NOTE — PROGRESS NOTES
Abdirahman Mendoza, was evaluated through a synchronous (real-time) audio-video encounter. The patient (or guardian if applicable) is aware that this is a billable service, which includes applicable co-pays. This Virtual Visit was conducted with patient's (and/or legal guardian's) consent. Patient identification was verified, and a caregiver was present when appropriate. The patient was located at Home: [address] [place] [phone]  Provider was located at [facility] ([address] [place], [place] [address]t): [address] [facility],  [address] [facility]      Abdirahman Mendoza (:  1945) is a Established patient, presenting virtually for evaluation of the following:    Assessment & Plan   Below is the assessment and plan developed based on review of pertinent history, physical exam, labs, studies, and medications. 1. Type 2 diabetes mellitus with hyperglycemia, without long-term current use of insulin (HCC)  -     Semaglutide 7 MG TABS; Take 7 mg by mouth daily (with breakfast), Disp-30 tablet, R-0Normal  2. COVID-19  -     AMB POC COVID-19 COV    Covid test is positive. Advised patient to drink plenty of fluids, rest, to do steam inhalations 3-4 times a day followed by over-the-counter saline nasal sprays 3-4 times a day, may use over-the-counter cough and cold medicines. Advised patient to stay in quarantine; discussed when to go to the ER. Sent the log of sugars for scanning; increased dose of Rybelsus to 7 mg, may decrease and stop Actos if she sees low sugars, advised to do better with eating similar sized portions during the day, to follow a Diabetic diet. Return for 1 mth- f/u Diabetes. Subjective   Diabetes  Pertinent negatives for hypoglycemia include no headaches. Associated symptoms include fatigue. Pertinent negatives for diabetes include no chest pain.    Patient came in to the office for follow up of Diabetes but said she has been having fevers for 2 days- hence switched to a Virtual

## 2023-09-29 ENCOUNTER — TELEMEDICINE (OUTPATIENT)
Dept: FAMILY MEDICINE CLINIC | Facility: CLINIC | Age: 78
End: 2023-09-29

## 2023-09-29 DIAGNOSIS — F41.1 GENERALIZED ANXIETY DISORDER: ICD-10-CM

## 2023-09-29 DIAGNOSIS — E11.65 TYPE 2 DIABETES MELLITUS WITH HYPERGLYCEMIA, WITHOUT LONG-TERM CURRENT USE OF INSULIN (HCC): Primary | ICD-10-CM

## 2023-09-29 RX ORDER — HYDROXYZINE HYDROCHLORIDE 25 MG/1
25 TABLET, FILM COATED ORAL EVERY 8 HOURS PRN
Qty: 90 TABLET | Refills: 0 | Status: SHIPPED | OUTPATIENT
Start: 2023-09-29 | End: 2023-10-29

## 2023-09-29 SDOH — ECONOMIC STABILITY: FOOD INSECURITY: WITHIN THE PAST 12 MONTHS, THE FOOD YOU BOUGHT JUST DIDN'T LAST AND YOU DIDN'T HAVE MONEY TO GET MORE.: NEVER TRUE

## 2023-09-29 SDOH — ECONOMIC STABILITY: FOOD INSECURITY: WITHIN THE PAST 12 MONTHS, YOU WORRIED THAT YOUR FOOD WOULD RUN OUT BEFORE YOU GOT MONEY TO BUY MORE.: NEVER TRUE

## 2023-09-29 SDOH — ECONOMIC STABILITY: INCOME INSECURITY: HOW HARD IS IT FOR YOU TO PAY FOR THE VERY BASICS LIKE FOOD, HOUSING, MEDICAL CARE, AND HEATING?: NOT HARD AT ALL

## 2023-09-29 ASSESSMENT — ENCOUNTER SYMPTOMS
COUGH: 0
PHOTOPHOBIA: 0
VOMITING: 1
SHORTNESS OF BREATH: 0
ABDOMINAL PAIN: 0
DIARRHEA: 0
WHEEZING: 0
BLOOD IN STOOL: 0
ABDOMINAL DISTENTION: 0
SINUS PAIN: 0
NAUSEA: 1

## 2023-09-29 ASSESSMENT — PATIENT HEALTH QUESTIONNAIRE - PHQ9
SUM OF ALL RESPONSES TO PHQ QUESTIONS 1-9: 0
1. LITTLE INTEREST OR PLEASURE IN DOING THINGS: 0
SUM OF ALL RESPONSES TO PHQ9 QUESTIONS 1 & 2: 0
2. FEELING DOWN, DEPRESSED OR HOPELESS: 0
SUM OF ALL RESPONSES TO PHQ QUESTIONS 1-9: 0

## 2023-09-29 NOTE — PROGRESS NOTES
polyphagia and polyuria. Neurological:  Negative for weakness and numbness. Psychiatric/Behavioral:  Negative for dysphoric mood, hallucinations, self-injury, sleep disturbance and suicidal ideas. The patient is nervous/anxious. The patient is not hyperactive. Patient-Reported Weight: 148lb  Patient-Reported Height: 5'2      Rachel Hendricks was evaluated through a patient-initiated, synchronous (real-time) audio only encounter. She (or guardian if applicable) is aware that it is a billable service, which includes applicable co-pays, with coverage as determined by her insurance carrier. This visit was conducted with the patient's (and/or Donnamae Delanson guardian's) verbal consent. She has not had a related appointment within my department in the past 7 days or scheduled within the next 24 hours. The patient was located in a state where the provider was licensed to provide care. The patient was located at: Home: [address] [place] [address]  The provider was located at:  Other: room    Total Time: minutes: 21-30 minutes    Vivian Guerra MD

## 2023-10-13 ENCOUNTER — TELEMEDICINE (OUTPATIENT)
Dept: FAMILY MEDICINE CLINIC | Facility: CLINIC | Age: 78
End: 2023-10-13

## 2023-10-13 DIAGNOSIS — F41.1 GENERALIZED ANXIETY DISORDER: ICD-10-CM

## 2023-10-13 DIAGNOSIS — E11.65 TYPE 2 DIABETES MELLITUS WITH HYPERGLYCEMIA, WITHOUT LONG-TERM CURRENT USE OF INSULIN (HCC): Primary | ICD-10-CM

## 2023-10-13 RX ORDER — HYDROXYZINE HYDROCHLORIDE 25 MG/1
25 TABLET, FILM COATED ORAL EVERY 8 HOURS PRN
Qty: 90 TABLET | Refills: 0 | Status: SHIPPED | OUTPATIENT
Start: 2023-10-13 | End: 2023-11-12

## 2023-10-13 ASSESSMENT — ENCOUNTER SYMPTOMS
SORE THROAT: 0
SHORTNESS OF BREATH: 0
NAUSEA: 1
WHEEZING: 0
VOMITING: 0
COUGH: 0
ABDOMINAL DISTENTION: 0
ABDOMINAL PAIN: 0
PHOTOPHOBIA: 0
BACK PAIN: 1
DIARRHEA: 0

## 2023-10-13 ASSESSMENT — PATIENT HEALTH QUESTIONNAIRE - PHQ9
SUM OF ALL RESPONSES TO PHQ QUESTIONS 1-9: 0
1. LITTLE INTEREST OR PLEASURE IN DOING THINGS: 0
2. FEELING DOWN, DEPRESSED OR HOPELESS: 0
SUM OF ALL RESPONSES TO PHQ9 QUESTIONS 1 & 2: 0

## 2023-10-13 NOTE — PROGRESS NOTES
Greta Mclean is a 78 y.o. female evaluated via audio-only technology on 10/13/2023 for Diabetes  . Assessment & Plan:   Type 2 diabetes mellitus with hyperglycemia, without long-term current use of insulin (HCC)  -     Semaglutide 7 MG TABS; Take 7 mg by mouth daily (with breakfast), Disp-30 tablet, R-5Normal  Generalized anxiety disorder  -     hydrOXYzine HCl (ATARAX) 25 MG tablet; Take 1 tablet by mouth every 8 hours as needed for Itching, Disp-90 tablet, R-0Normal    Continue current management for Diabetes, to continue to monitor sugars and keep a log. Discussed with patient again that Ativan is a controlled medication and can be habit forming- to use it sparingly for panic attacks. Continue Hydroxyzine 25 mg as needed for anxiety and up to every 8 hours, she refuses Counseling. Advised to get help from Orthopedics for her hip pain. Return if symptoms worsen or fail to improve. Subjective:   Virtual visit for a 2 week follow up of-  Diabetes-  at last labs, HbA1c was 8.4 in August 2023, was 7.4 in February 2023; takes Metformin 1000 mg 1 tab bid, Glipizide 10 mg 2 tabs twice daily with meals, Rybelsus 7 mg in am - she had nausea 1-2 times in the last 2 weeks (stopped Toujeo 2 units in the past, stopped Jardiance 10 mg due to yeast infections, we stopped Actos). She says her fasting sugars are 82- 112;  no new e/o hypoglycemia- typically felt shaky, feels rough. Her 2 hrs PP are 130- 152. She also takes Aspirin, Atorvastatin 10 mg, Lisinopril 10mg, fish oil capsule twice a week. Denies any associated symptoms. Her last eye exam was in June 2023, had b/l cataract surgery on 3/29/21 and 4/12/21. She sees a Podiatrist, Dr Isra Snyder, had surgery in the left foot for inflamed bunion; uses Diabetic shoes, goes every 3 months- last was about 1 month ago. She is good about watching her sweets and carbohydrates but eats candies and pies off and on.      Generalized Anxiety and Insomnia- things with her

## 2023-10-16 ENCOUNTER — OFFICE VISIT (OUTPATIENT)
Dept: ORTHOPEDIC SURGERY | Age: 78
End: 2023-10-16
Payer: MEDICARE

## 2023-10-16 VITALS — WEIGHT: 153 LBS | BODY MASS INDEX: 28.16 KG/M2 | HEIGHT: 62 IN

## 2023-10-16 DIAGNOSIS — M54.16 LUMBAR RADICULOPATHY: Primary | ICD-10-CM

## 2023-10-16 PROCEDURE — G8417 CALC BMI ABV UP PARAM F/U: HCPCS | Performed by: PHYSICIAN ASSISTANT

## 2023-10-16 PROCEDURE — G8484 FLU IMMUNIZE NO ADMIN: HCPCS | Performed by: PHYSICIAN ASSISTANT

## 2023-10-16 PROCEDURE — 1123F ACP DISCUSS/DSCN MKR DOCD: CPT | Performed by: PHYSICIAN ASSISTANT

## 2023-10-16 PROCEDURE — G8427 DOCREV CUR MEDS BY ELIG CLIN: HCPCS | Performed by: PHYSICIAN ASSISTANT

## 2023-10-16 PROCEDURE — 99214 OFFICE O/P EST MOD 30 MIN: CPT | Performed by: PHYSICIAN ASSISTANT

## 2023-10-16 PROCEDURE — G8399 PT W/DXA RESULTS DOCUMENT: HCPCS | Performed by: PHYSICIAN ASSISTANT

## 2023-10-16 PROCEDURE — 1036F TOBACCO NON-USER: CPT | Performed by: PHYSICIAN ASSISTANT

## 2023-10-16 PROCEDURE — 1090F PRES/ABSN URINE INCON ASSESS: CPT | Performed by: PHYSICIAN ASSISTANT

## 2023-10-16 NOTE — PROGRESS NOTES
10/16/23        Name: Digna Fam  YOB: 1945  Gender: female  MRN: [id number]    CC: Follow-up       HPI: Digna Fam is a 78 y.o. female who returns for Follow-up         Patient turns the office today for follow-up after injection. She underwent a right L5 selective nerve root block on 8/31/2023. She got 0% improvement for 0 days following this injection. She continues to have back pain rating to the right hip and leg. She is also having pain in her proximal thigh as well. History was obtained by patient      Meds/PSH/PMH/FH/SH: This information has been reviewed. ALLERGIES: No Known Allergies           Physical Examination:    Minimal tenderness palpation over the right greater trochanter. Imaging:         MRI Result (most recent):  MRI ABDOMEN W WO CONTRAST 06/02/2020    Narrative  EXAM: MRI ABDOMEN WITHOUT AND WITH IV CONTRAST    Comparison: CT abdomen pelvis dated 1/27/2020. Indication: Left adrenal nodule on CT. Technique:  Multiplanar, multisequence MR imaging was performed of the abdomen  prior to and following gadolinium contrast. 13 mL  Prohance contrast material  was administrated intravenously for the examination. This study was acquired  following the IV administration of contrast material, given the patient's  indications for the examination. If IV contrast material had not been  administered, the likely of detecting abnormalities relevant to the patient's  condition would have been substantially decreased. Three dimensional MRCP was  performed using maximum intensity projection reconstruction. FINDINGS:  Visualized Lungs: Normal.    Liver: Normal in size and morphology. No fat or iron deposition. No focal  lesions. Gallbladder/biliary: Normal gallbladder. No biliary dilatation. Pancreas: Normal.    Spleen: Normal.    Adrenals: Stable 1.0 cm left adrenal nodule, which is T1 iso to slightly  hypointense and is nonenhancing.  There is

## 2023-10-16 NOTE — PROGRESS NOTES
10/16/23        Name: Luciana Little  YOB: 1945  Gender: female  MRN: [id number]    CC: Follow-up       HPI: Luciana Little is a 78 y.o. female who returns for Follow-up         Patient returns the office today after injection. Patient underwent right L5 selective nerve root block. They report greater than 80% improvement in their pain since the injection, and this continues thru today. They feel improvement in their functional abilities and greater comfort with their ADL's. Meds/PSH/PMH/FH/SH: This information has been reviewed. ALLERGIES: No Known Allergies           Physical Examination:            Imaging:         MRI Result (most recent):  MRI ABDOMEN W WO CONTRAST 06/02/2020    Narrative  EXAM: MRI ABDOMEN WITHOUT AND WITH IV CONTRAST    Comparison: CT abdomen pelvis dated 1/27/2020. Indication: Left adrenal nodule on CT. Technique:  Multiplanar, multisequence MR imaging was performed of the abdomen  prior to and following gadolinium contrast. 13 mL  Prohance contrast material  was administrated intravenously for the examination. This study was acquired  following the IV administration of contrast material, given the patient's  indications for the examination. If IV contrast material had not been  administered, the likely of detecting abnormalities relevant to the patient's  condition would have been substantially decreased. Three dimensional MRCP was  performed using maximum intensity projection reconstruction. FINDINGS:  Visualized Lungs: Normal.    Liver: Normal in size and morphology. No fat or iron deposition. No focal  lesions. Gallbladder/biliary: Normal gallbladder. No biliary dilatation. Pancreas: Normal.    Spleen: Normal.    Adrenals: Stable 1.0 cm left adrenal nodule, which is T1 iso to slightly  hypointense and is nonenhancing. There is mild signal loss on opposed phase  images suggesting intracytoplasmic fat.  The right adrenal is

## 2023-10-26 ENCOUNTER — OFFICE VISIT (OUTPATIENT)
Dept: ORTHOPEDIC SURGERY | Age: 78
End: 2023-10-26

## 2023-10-26 VITALS — WEIGHT: 153 LBS | HEIGHT: 62 IN | BODY MASS INDEX: 28.16 KG/M2

## 2023-10-26 DIAGNOSIS — M54.16 LUMBAR RADICULOPATHY: Primary | ICD-10-CM

## 2023-10-26 DIAGNOSIS — R10.31 RIGHT GROIN PAIN: ICD-10-CM

## 2023-10-26 DIAGNOSIS — M25.551 RIGHT HIP PAIN: ICD-10-CM

## 2023-10-26 NOTE — PROGRESS NOTES
such as AVN or high-grade chondral loss. I will see the patient back in the office after the MRI of the hip to review the results.

## 2023-11-09 ENCOUNTER — OFFICE VISIT (OUTPATIENT)
Dept: ORTHOPEDIC SURGERY | Age: 78
End: 2023-11-09
Payer: MEDICARE

## 2023-11-09 DIAGNOSIS — M48.062 SPINAL STENOSIS OF LUMBAR REGION WITH NEUROGENIC CLAUDICATION: Primary | ICD-10-CM

## 2023-11-09 DIAGNOSIS — M47.816 LUMBAR SPONDYLOSIS: ICD-10-CM

## 2023-11-09 PROCEDURE — 64484 NJX AA&/STRD TFRM EPI L/S EA: CPT | Performed by: PHYSICAL MEDICINE & REHABILITATION

## 2023-11-09 PROCEDURE — 64493 INJ PARAVERT F JNT L/S 1 LEV: CPT | Performed by: PHYSICAL MEDICINE & REHABILITATION

## 2023-11-09 PROCEDURE — 64483 NJX AA&/STRD TFRM EPI L/S 1: CPT | Performed by: PHYSICAL MEDICINE & REHABILITATION

## 2023-11-09 RX ORDER — TRIAMCINOLONE ACETONIDE 40 MG/ML
40 INJECTION, SUSPENSION INTRA-ARTICULAR; INTRAMUSCULAR ONCE
Status: COMPLETED | OUTPATIENT
Start: 2023-11-09 | End: 2023-11-09

## 2023-11-09 RX ADMIN — TRIAMCINOLONE ACETONIDE 40 MG: 40 INJECTION, SUSPENSION INTRA-ARTICULAR; INTRAMUSCULAR at 15:37

## 2023-11-09 NOTE — PROGRESS NOTES
Name: Fidel Gray  YOB: 1945  Gender: female  MRN: [id number]        Transforaminal ANNA Procedure Note    Procedure: Right  L4-L5 facet joint injection and right L5-S1 and right S1-S2 transforaminal epidural steroid injections     Precautions: Fidel Gray denies prior sensitivity to steroid, local anesthetic, iodine, or shellfish. Consent:  Consent was obtained prior to the procedure. The procedure was discussed at length with Fidel Gray. She was given the opportunity to ask questions regarding the procedure and its associated risks. In addition to the potential risks associated with the procedure itself, the patient was informed both verbally and in writing of potential side effects of the use glucocorticoids. The patient appeared to comprehend the informed consent and desired to have the procedure performed, and informed consent was signed. She was placed in a prone position on the fluoroscopy table and the skin was prepped and draped in a routine sterile fashion. The areas to be injected were each anesthetized with 1 ml of 1% Lidocaine. A 22 gauge 3.5 inch spinal needle was carefully advanced under fluoroscopic guidance to the right L5-S1 transforaminal space  0.5 ml of 70% of Omnipaque was injected to confirm proper needle placement and absence of subdural or vascular flow Once proper placement was confirmed, 0.5ml of 0.25 marcaine and 40 mg kenalog were injected through the spinal needle. The above procedure was then repeated at the Right  S1-S2 transforaminal space. The above procedure was then repeated at the right L4-5 facet joint space, using a 25 gauge spinal needle. Fluoroscopic guidance was used intermittently over a 10-minute period to insure proper needle placement and her safety. A hard copy of the fluoroscopic image has been placed in her chart and is saved on the C-arm hard drive.  She was monitored for 30 minutes after the

## 2023-11-10 ENCOUNTER — TELEPHONE (OUTPATIENT)
Dept: FAMILY MEDICINE CLINIC | Facility: CLINIC | Age: 78
End: 2023-11-10

## 2023-11-10 ENCOUNTER — OFFICE VISIT (OUTPATIENT)
Dept: ORTHOPEDIC SURGERY | Age: 78
End: 2023-11-10
Payer: MEDICARE

## 2023-11-10 VITALS — BODY MASS INDEX: 28.16 KG/M2 | HEIGHT: 62 IN | WEIGHT: 153 LBS

## 2023-11-10 DIAGNOSIS — M84.351A STRESS FRACTURE OF RIGHT HIP: ICD-10-CM

## 2023-11-10 DIAGNOSIS — M54.16 LUMBAR RADICULOPATHY: Primary | ICD-10-CM

## 2023-11-10 PROCEDURE — 1123F ACP DISCUSS/DSCN MKR DOCD: CPT | Performed by: PHYSICIAN ASSISTANT

## 2023-11-10 PROCEDURE — G8427 DOCREV CUR MEDS BY ELIG CLIN: HCPCS | Performed by: PHYSICIAN ASSISTANT

## 2023-11-10 PROCEDURE — 1036F TOBACCO NON-USER: CPT | Performed by: PHYSICIAN ASSISTANT

## 2023-11-10 PROCEDURE — G8399 PT W/DXA RESULTS DOCUMENT: HCPCS | Performed by: PHYSICIAN ASSISTANT

## 2023-11-10 PROCEDURE — 1090F PRES/ABSN URINE INCON ASSESS: CPT | Performed by: PHYSICIAN ASSISTANT

## 2023-11-10 PROCEDURE — G8484 FLU IMMUNIZE NO ADMIN: HCPCS | Performed by: PHYSICIAN ASSISTANT

## 2023-11-10 PROCEDURE — 99214 OFFICE O/P EST MOD 30 MIN: CPT | Performed by: PHYSICIAN ASSISTANT

## 2023-11-10 PROCEDURE — G8417 CALC BMI ABV UP PARAM F/U: HCPCS | Performed by: PHYSICIAN ASSISTANT

## 2023-11-10 NOTE — PROGRESS NOTES
11/10/23        Name: Lorena Lorenzo  YOB: 1945  Gender: female  MRN: [id number]    CC: Follow-up (MRI Results )       HPI: Lorena Lorenzo is a 78 y.o. female who returns for Follow-up (MRI Results )         Patient returns the office today for follow-up after her injection with Dr. Katlin Chance as well as after undergoing an MRI of the right hip for right hip pain. She reports that since undergoing a right L4-5 facet injection along with a right L5 and S1 selective nerve root block with Dr. Katlin Chance yesterday she is beginning to feel some improvement in her right leg pain specifically, approximately 80% improvement in her symptoms since yesterday. .  She still has some pain around her right hip. She is leaving in 2 days to go out of town until after Thanksgiving. History was obtained by patient      Meds/PSH/PMH/FH/SH: This information has been reviewed. ALLERGIES: No Known Allergies           Physical Examination:            Imaging:         MRI Result (most recent):  MRI HIP RIGHT WO CONTRAST 11/07/2023    Narrative  MRI OF THE RIGHT HIP WITHOUT CONTRAST. Clinical Indication: Right groin and hip pain, lumbar radiculopathy    Procedure: Multiplanar and multisequence MR images performed of the right hip  without gadolinium contrast.    Comparison: Plain radiographs of the right hip dated 10/26/2023; CT abdomen and  pelvis dated 1/27/2020    Findings: There is a fracture at the junction of the right superior pubic ramus and medial  acetabulum with surrounding edema. A stress reaction is favored. Pathologic  fracture cannot be excluded. No additional bone lesions evident. There is a  minimal amount of edema in the adjacent soft tissue. There is normal signal in  the ischium and proximal femur. The articular cartilage interface between the  femoral head and acetabulum is well-maintained without arthrosis. No para labral  cysts present to suspect occult labral pathology.  There is no

## 2023-11-10 NOTE — TELEPHONE ENCOUNTER
In addition to [facility], I think patient may benefit from [facility] care.  Could you please call Dr Lyles Host office and ask them to refer to [facility]

## 2023-11-10 NOTE — TELEPHONE ENCOUNTER
Dr. Dioni Martel called the office and said that Mrs. Hendricks had back injections done and also had a MRI showing a fractured pelvic. She said that Mrs. Hendricks is having a hard time trying to take care of Mr. Sendy Maria and think they would benefit from meals on wheels. I have printed out the order forms for this and will fill it out to be signed.

## 2023-11-16 ENCOUNTER — TELEPHONE (OUTPATIENT)
Dept: ORTHOPEDIC SURGERY | Age: 78
End: 2023-11-16

## 2023-11-16 NOTE — TELEPHONE ENCOUNTER
He is referring her to meals on wheels. She feels she would benefit from home health but they said JPNICO would need to make the referral for home health. If you have any questions, please give her a call.

## 2023-11-27 ENCOUNTER — TELEPHONE (OUTPATIENT)
Dept: FAMILY MEDICINE CLINIC | Facility: CLINIC | Age: 78
End: 2023-11-27

## 2023-11-27 NOTE — TELEPHONE ENCOUNTER
----- Message from Mily Amanda sent at 11/27/2023  8:21 AM EST -----  Subject: Message to Provider    QUESTIONS  Information for Provider? Patient called in she would like Dr. Sully Del Castillo   give her a call. She states that she has been seeing Dr. Chiara Frank for spinal   injections and she was supposed to be reaching out about meals on wheels   please call patient   ---------------------------------------------------------------------------  --------------  Izabella [place] Ania  [phone]; Do not leave any message, patient will call back for answer  ---------------------------------------------------------------------------  --------------  SCRIPT ANSWERS  Relationship to Patient?  Self

## 2023-11-29 ENCOUNTER — HOSPITAL ENCOUNTER (OUTPATIENT)
Dept: NUCLEAR MEDICINE | Age: 78
Discharge: HOME | End: 2023-12-02
Payer: MEDICARE

## 2023-11-29 DIAGNOSIS — M84.351A STRESS FRACTURE OF RIGHT HIP: ICD-10-CM

## 2023-11-29 DIAGNOSIS — M54.16 LUMBAR RADICULOPATHY: ICD-10-CM

## 2023-11-29 PROCEDURE — 78306 BONE IMAGING WHOLE BODY: CPT | Performed by: PHYSICIAN ASSISTANT

## 2023-11-29 PROCEDURE — 3430000000 HC RX DIAGNOSTIC RADIOPHARMACEUTICAL: Performed by: PHYSICIAN ASSISTANT

## 2023-11-29 PROCEDURE — A9503 TC99M MEDRONATE: HCPCS | Performed by: PHYSICIAN ASSISTANT

## 2023-11-29 RX ORDER — TC 99M MEDRONATE 20 MG/10ML
26.5 INJECTION, POWDER, LYOPHILIZED, FOR SOLUTION INTRAVENOUS
Status: COMPLETED | OUTPATIENT
Start: 2023-11-29 | End: 2023-11-29

## 2023-11-29 RX ADMIN — TC 99M MEDRONATE 26.5 MILLICURIE: 20 INJECTION, POWDER, LYOPHILIZED, FOR SOLUTION INTRAVENOUS at 09:45

## 2023-11-30 ENCOUNTER — CLINICAL DOCUMENTATION (OUTPATIENT)
Dept: ORTHOPEDIC SURGERY | Age: 78
End: 2023-11-30

## 2023-12-05 ENCOUNTER — OFFICE VISIT (OUTPATIENT)
Dept: ORTHOPEDIC SURGERY | Age: 78
End: 2023-12-05
Payer: MEDICARE

## 2023-12-05 VITALS — BODY MASS INDEX: 28.16 KG/M2 | HEIGHT: 62 IN | WEIGHT: 153 LBS

## 2023-12-05 DIAGNOSIS — M54.16 LUMBAR RADICULOPATHY: Primary | ICD-10-CM

## 2023-12-05 DIAGNOSIS — S32.401A CLOSED NONDISPLACED FRACTURE OF RIGHT ACETABULUM, UNSPECIFIED PORTION OF ACETABULUM, INITIAL ENCOUNTER (HCC): ICD-10-CM

## 2023-12-05 PROCEDURE — 99214 OFFICE O/P EST MOD 30 MIN: CPT | Performed by: PHYSICIAN ASSISTANT

## 2023-12-05 PROCEDURE — G8427 DOCREV CUR MEDS BY ELIG CLIN: HCPCS | Performed by: PHYSICIAN ASSISTANT

## 2023-12-05 PROCEDURE — G8399 PT W/DXA RESULTS DOCUMENT: HCPCS | Performed by: PHYSICIAN ASSISTANT

## 2023-12-05 PROCEDURE — 1090F PRES/ABSN URINE INCON ASSESS: CPT | Performed by: PHYSICIAN ASSISTANT

## 2023-12-05 PROCEDURE — 1123F ACP DISCUSS/DSCN MKR DOCD: CPT | Performed by: PHYSICIAN ASSISTANT

## 2023-12-05 PROCEDURE — G8484 FLU IMMUNIZE NO ADMIN: HCPCS | Performed by: PHYSICIAN ASSISTANT

## 2023-12-05 PROCEDURE — G8417 CALC BMI ABV UP PARAM F/U: HCPCS | Performed by: PHYSICIAN ASSISTANT

## 2023-12-05 PROCEDURE — 1036F TOBACCO NON-USER: CPT | Performed by: PHYSICIAN ASSISTANT

## 2023-12-05 PROCEDURE — E0143 WALKER FOLDING WHEELED W/O S: HCPCS | Performed by: PHYSICIAN ASSISTANT

## 2023-12-05 RX ORDER — TRAMADOL HYDROCHLORIDE 50 MG/1
50 TABLET ORAL EVERY 6 HOURS PRN
Qty: 20 TABLET | Refills: 0 | Status: SHIPPED | OUTPATIENT
Start: 2023-12-05 | End: 2023-12-10

## 2023-12-05 NOTE — PROGRESS NOTES
Patient returns the office today for follow  12/05/23        Name: Jayda Martinez  YOB: 1945  Gender: female  MRN: [id number]    CC: Follow-up       HPI: Jayda Martinez is a 78 y.o. female who returns for Follow-up         Patient returns the office today for follow-up after bone scan. She continues to have right hip and thigh pain. Worse with weightbearing and walking. History was obtained by patient      Meds/PSH/PMH/FH/SH: This information has been reviewed. ALLERGIES: No Known Allergies           Physical Examination:    Ambulating with a cane with an antalgic gait. Imaging:     AP and lateral views of the cervical spine today reveal loss lordosis. Multilevel degenerative changes, appears most prominent at C4-5. Where there are endplate changes to the superior endplate. NM BONE SCAN WHOLE BODY  Order: [id number]  Status: Final result       Visible to patient: Yes (seen)       Next appt: 02/01/2024 at 08:50 AM in Family Medicine (Kiana Hidalgo MD)       Dx: Lumbar radiculopathy; Stress fracture. ..    0 Result Notes  Details    Reading Physician Reading Date Result Priority   Ronna Hinkle MD  [phone] 11/29/2023      Narrative & Impression  History: Fall 2 months ago with stress fracture in the right hip. Back pain. EXAM: Nuclear medicine bone scan     TECHNIQUE: 26.5 mCi technetium 99m MDP is administered. Whole body and spot  planar images are available for review. COMPARISON: No similar studies available for comparison. Correlation is made to  an MRI of the right hip dated 11/7/2023     FINDINGS: There is increased radiotracer activity seen within the right  acetabulum, at the site of prior MRI abnormality. Degenerative appearing uptake  present within the right knee, wrists, shoulders, as well as within the cervical  spine. There is normal  uptake present. IMPRESSION:  1.  Increased uptake present within the right acetabulum at the

## 2023-12-05 NOTE — PROGRESS NOTES
Patient was fitted and instructed on use of walker with wheels. I demonstrated the correct way to lock and unlock the walker. The walker was adjusted to the patient's height and arm length. Patient walked around with the walker to insure it was set at a comfortable height. Patient read and signed documenting they understand and agree to [facility]'s current DME return policy.

## 2023-12-06 DIAGNOSIS — R93.7 ABNORMAL FINDINGS ON DIAGNOSTIC IMAGING OF SPINE: Primary | ICD-10-CM

## 2023-12-07 ENCOUNTER — TELEPHONE (OUTPATIENT)
Dept: ORTHOPEDIC SURGERY | Age: 78
End: 2023-12-07

## 2023-12-15 ENCOUNTER — TELEPHONE (OUTPATIENT)
Dept: FAMILY MEDICINE CLINIC | Facility: CLINIC | Age: 78
End: 2023-12-15

## 2023-12-15 DIAGNOSIS — B37.31 VAGINAL YEAST INFECTION: Primary | ICD-10-CM

## 2023-12-15 DIAGNOSIS — E11.65 TYPE 2 DIABETES MELLITUS WITH HYPERGLYCEMIA, WITHOUT LONG-TERM CURRENT USE OF INSULIN (HCC): ICD-10-CM

## 2023-12-15 RX ORDER — FLUCONAZOLE 150 MG/1
150 TABLET ORAL ONCE
Qty: 1 TABLET | Refills: 0 | Status: SHIPPED | OUTPATIENT
Start: 2023-12-15 | End: 2023-12-15

## 2023-12-15 NOTE — TELEPHONE ENCOUNTER
Patient came today for her 's appointment and c/o vaginal yeast infection with elevated sugars for several months - sugars went up after she received steroid injections in her back in the summer. Says her fasting sugars are almost around 200 every day. Prescribed Diflucan 150 mg now, increased Rybelsus to 14 mg daily, to monitor sugars- keep a log. Advised to schedule an appointment in 1 month for f/u Diabetes.

## 2023-12-21 ENCOUNTER — TELEPHONE (OUTPATIENT)
Dept: ORTHOPEDIC SURGERY | Age: 78
End: 2023-12-21

## 2023-12-21 NOTE — TELEPHONE ENCOUNTER
She is requesting a refill on Tramadol to [facility] in [place] on [facility].

## 2023-12-27 ENCOUNTER — TELEPHONE (OUTPATIENT)
Dept: ORTHOPEDIC SURGERY | Age: 78
End: 2023-12-27

## 2023-12-27 NOTE — TELEPHONE ENCOUNTER
She is requesting a refill on Tramadol 50mg to [facility] on [facility] [address] Her follow up with Tejal Bojorquez is in March.

## 2023-12-27 NOTE — TELEPHONE ENCOUNTER
Called pt back and as per paolo pt has an appt with dr ricketts they should be managing this issue for her going forward, pt understood,

## 2024-01-20 DIAGNOSIS — E11.65 TYPE 2 DIABETES MELLITUS WITH HYPERGLYCEMIA, WITHOUT LONG-TERM CURRENT USE OF INSULIN (HCC): ICD-10-CM

## 2024-01-22 RX ORDER — ORAL SEMAGLUTIDE 14 MG/1
1 TABLET ORAL
Qty: 30 TABLET | Refills: 0 | OUTPATIENT
Start: 2024-01-22

## 2024-01-23 ENCOUNTER — OFFICE VISIT (OUTPATIENT)
Dept: ORTHOPEDIC SURGERY | Age: 79
End: 2024-01-23

## 2024-01-23 DIAGNOSIS — S32.401A CLOSED NONDISPLACED FRACTURE OF RIGHT ACETABULUM, UNSPECIFIED PORTION OF ACETABULUM, INITIAL ENCOUNTER (HCC): Primary | ICD-10-CM

## 2024-01-23 DIAGNOSIS — M70.61 GREATER TROCHANTERIC BURSITIS OF RIGHT HIP: ICD-10-CM

## 2024-01-23 RX ORDER — METHYLPREDNISOLONE ACETATE 80 MG/ML
80 INJECTION, SUSPENSION INTRA-ARTICULAR; INTRALESIONAL; INTRAMUSCULAR; SOFT TISSUE ONCE
Status: COMPLETED | OUTPATIENT
Start: 2024-01-23 | End: 2024-01-23

## 2024-01-23 RX ORDER — TRAMADOL HYDROCHLORIDE 50 MG/1
50 TABLET ORAL EVERY 4 HOURS PRN
Qty: 42 TABLET | Refills: 1 | Status: SHIPPED | OUTPATIENT
Start: 2024-01-23 | End: 2024-02-06

## 2024-01-23 RX ADMIN — METHYLPREDNISOLONE ACETATE 80 MG: 80 INJECTION, SUSPENSION INTRA-ARTICULAR; INTRALESIONAL; INTRAMUSCULAR; SOFT TISSUE at 12:34

## 2024-01-23 NOTE — PROGRESS NOTES
Progress Note    Patient: Khloe Hendricks MRN: [id number]  SSN: [id number]    YOB: 1945  Age: 78 y.o.  Sex: female        1/23/2024      Subjective:     Patient is here today for evaluation.  She apparently had a right sided lateral compression type I pelvic ring injury diagnosis a couple months ago.  This was seen on MRI.  She has had a lot of trouble with her back in her hip.  She has had what sounds like a trochanteric bursa injection as well as maybe an epidural steroid injection.  She says both of these sort of helped marginally is just that the pain is come back.  She has a lot of discomfort in her back as well.  She did have a fall at that time    Objective:     There were no vitals filed for this visit.       Physical Exam:     Skin - no open wounds or pressure sores  Motor and sensory function intact in RIGHT LOWER extremity  Pulses palpable in RIGHT LOWER extremity     XRAY FINDINGS:  [id number] right-sided pelvic ring injury, findings-AP pelvis shows the right pelvic ring injury as well as the zone 2 sacral fracture both show significant evidence of healing.  The overall contour of her pelvis is essentially normal.  She does appear to have some degenerative changes in her lumbar spine as well.  Only mild degenerative changes in her hip itself on the right side.  Impression # healing pelvic ring injury    Assessment:     Healing pelvic ring injury, right hip trochanteric bursitis    Plan:     She does seem to have a healing pelvic ring injury but she also seems to have some element of trochanteric bursitis.  She did have some relief with the shot before so I think is reasonable to try this again so after sterile prep I have injected her right greater troches area and have injected this with lidocaine Marcaine and Depo-Medrol.  She seems to have tolerated this well.  I think we can allow her to just follow-up on as-needed basis now    PROCEDURE NOTE    PREOP DIAGNOSIS-right

## 2024-02-01 ENCOUNTER — TELEPHONE (OUTPATIENT)
Dept: FAMILY MEDICINE CLINIC | Facility: CLINIC | Age: 79
End: 2024-02-01

## 2024-02-01 ENCOUNTER — OFFICE VISIT (OUTPATIENT)
Dept: FAMILY MEDICINE CLINIC | Facility: CLINIC | Age: 79
End: 2024-02-01
Payer: MEDICARE

## 2024-02-01 VITALS
OXYGEN SATURATION: 98 % | SYSTOLIC BLOOD PRESSURE: 112 MMHG | TEMPERATURE: 98.2 F | DIASTOLIC BLOOD PRESSURE: 60 MMHG | RESPIRATION RATE: 16 BRPM | BODY MASS INDEX: 26.35 KG/M2 | HEIGHT: 62 IN | WEIGHT: 143.2 LBS | HEART RATE: 86 BPM

## 2024-02-01 DIAGNOSIS — F41.1 GENERALIZED ANXIETY DISORDER: ICD-10-CM

## 2024-02-01 DIAGNOSIS — K21.9 GASTROESOPHAGEAL REFLUX DISEASE WITHOUT ESOPHAGITIS: ICD-10-CM

## 2024-02-01 DIAGNOSIS — F51.04 PSYCHOPHYSIOLOGICAL INSOMNIA: ICD-10-CM

## 2024-02-01 DIAGNOSIS — E11.65 TYPE 2 DIABETES MELLITUS WITH HYPERGLYCEMIA, WITHOUT LONG-TERM CURRENT USE OF INSULIN (HCC): Primary | ICD-10-CM

## 2024-02-01 DIAGNOSIS — G25.81 RESTLESS LEGS SYNDROME: ICD-10-CM

## 2024-02-01 DIAGNOSIS — B37.31 VAGINAL YEAST INFECTION: ICD-10-CM

## 2024-02-01 DIAGNOSIS — E66.3 OVERWEIGHT: ICD-10-CM

## 2024-02-01 PROCEDURE — 1036F TOBACCO NON-USER: CPT | Performed by: FAMILY MEDICINE

## 2024-02-01 PROCEDURE — G8399 PT W/DXA RESULTS DOCUMENT: HCPCS | Performed by: FAMILY MEDICINE

## 2024-02-01 PROCEDURE — 1090F PRES/ABSN URINE INCON ASSESS: CPT | Performed by: FAMILY MEDICINE

## 2024-02-01 PROCEDURE — 99215 OFFICE O/P EST HI 40 MIN: CPT | Performed by: FAMILY MEDICINE

## 2024-02-01 PROCEDURE — G8484 FLU IMMUNIZE NO ADMIN: HCPCS | Performed by: FAMILY MEDICINE

## 2024-02-01 PROCEDURE — G8427 DOCREV CUR MEDS BY ELIG CLIN: HCPCS | Performed by: FAMILY MEDICINE

## 2024-02-01 PROCEDURE — G8417 CALC BMI ABV UP PARAM F/U: HCPCS | Performed by: FAMILY MEDICINE

## 2024-02-01 PROCEDURE — 1123F ACP DISCUSS/DSCN MKR DOCD: CPT | Performed by: FAMILY MEDICINE

## 2024-02-01 RX ORDER — ROPINIROLE 2 MG/1
2 TABLET, FILM COATED ORAL 2 TIMES DAILY
Qty: 180 TABLET | Refills: 1 | Status: SHIPPED | OUTPATIENT
Start: 2024-02-01

## 2024-02-01 RX ORDER — TRAZODONE HYDROCHLORIDE 100 MG/1
100 TABLET ORAL NIGHTLY
Qty: 180 TABLET | Refills: 1 | Status: SHIPPED | OUTPATIENT
Start: 2024-02-01 | End: 2024-02-01 | Stop reason: SDUPTHER

## 2024-02-01 RX ORDER — LISINOPRIL 10 MG/1
10 TABLET ORAL DAILY
Qty: 90 TABLET | Refills: 1 | Status: SHIPPED | OUTPATIENT
Start: 2024-02-01

## 2024-02-01 RX ORDER — GLUCOSAMINE HCL/CHONDROITIN SU 500-400 MG
CAPSULE ORAL
Qty: 100 STRIP | Refills: 3 | Status: SHIPPED | OUTPATIENT
Start: 2024-02-01

## 2024-02-01 RX ORDER — LORAZEPAM 0.5 MG/1
0.5 TABLET ORAL DAILY PRN
Qty: 15 TABLET | Refills: 0 | Status: SHIPPED | OUTPATIENT
Start: 2024-02-01 | End: 2024-03-02

## 2024-02-01 RX ORDER — ATORVASTATIN CALCIUM 10 MG/1
10 TABLET, FILM COATED ORAL DAILY
Qty: 90 TABLET | Refills: 1 | Status: SHIPPED | OUTPATIENT
Start: 2024-02-01

## 2024-02-01 RX ORDER — GLIPIZIDE 10 MG/1
TABLET ORAL
Qty: 360 TABLET | Refills: 1 | Status: SHIPPED | OUTPATIENT
Start: 2024-02-01

## 2024-02-01 RX ORDER — PREGABALIN 100 MG/1
100 CAPSULE ORAL 2 TIMES DAILY
Qty: 180 CAPSULE | Refills: 1 | Status: SHIPPED | OUTPATIENT
Start: 2024-02-01 | End: 2024-07-30

## 2024-02-01 RX ORDER — FLUCONAZOLE 150 MG/1
TABLET ORAL
COMMUNITY
Start: 2023-12-15 | End: 2024-02-01

## 2024-02-01 RX ORDER — TRAZODONE HYDROCHLORIDE 100 MG/1
TABLET ORAL
Qty: 180 TABLET | Refills: 1 | Status: SHIPPED | OUTPATIENT
Start: 2024-02-01

## 2024-02-01 RX ORDER — FLUCONAZOLE 150 MG/1
150 TABLET ORAL ONCE
Qty: 1 TABLET | Refills: 0 | Status: SHIPPED | OUTPATIENT
Start: 2024-02-01 | End: 2024-02-01

## 2024-02-01 RX ORDER — OMEPRAZOLE 40 MG/1
40 CAPSULE, DELAYED RELEASE ORAL DAILY
Qty: 90 CAPSULE | Refills: 1 | Status: SHIPPED | OUTPATIENT
Start: 2024-02-01

## 2024-02-01 ASSESSMENT — ENCOUNTER SYMPTOMS
ABDOMINAL PAIN: 0
PHOTOPHOBIA: 0
WHEEZING: 0
BACK PAIN: 1
DIARRHEA: 0
SINUS PAIN: 0
VOMITING: 0
COUGH: 0
NAUSEA: 0
SHORTNESS OF BREATH: 0

## 2024-02-01 ASSESSMENT — PATIENT HEALTH QUESTIONNAIRE - PHQ9
1. LITTLE INTEREST OR PLEASURE IN DOING THINGS: 0
SUM OF ALL RESPONSES TO PHQ QUESTIONS 1-9: 0
2. FEELING DOWN, DEPRESSED OR HOPELESS: 0
SUM OF ALL RESPONSES TO PHQ QUESTIONS 1-9: 0
SUM OF ALL RESPONSES TO PHQ9 QUESTIONS 1 & 2: 0

## 2024-02-01 NOTE — TELEPHONE ENCOUNTER
[facility] called and wanted a verification on the patient trazodone direction. They have two different set of instructions.

## 2024-02-01 NOTE — PROGRESS NOTES
Khloe Hendricks (:  1945) is a 78 y.o. female,Established patient, here for evaluation of the following chief complaint(s):  Medication Refill and Labs Only         ASSESSMENT/PLAN:  1. Type 2 diabetes mellitus with hyperglycemia, without long-term current use of insulin (HCC)  -     atorvastatin (LIPITOR) 10 MG tablet; Take 1 tablet by mouth daily, Disp-90 tablet, R-1Normal  -     blood glucose monitor strips; Check once daily as directed. Dx- Diabetes E11.65, Disp-100 strip, R-3, Normal  -     glipiZIDE (GLUCOTROL) 10 MG tablet; Take 2 tabs po twice daily with meals, Disp-360 tablet, R-1Normal  -     lisinopril (PRINIVIL;ZESTRIL) 10 MG tablet; Take 1 tablet by mouth daily, Disp-90 tablet, R-1Normal  -     metFORMIN (GLUCOPHAGE) 1000 MG tablet; Take 1 tablet by mouth 2 times daily (with meals), Disp-180 tablet, R-1Normal  -     Semaglutide 14 MG TABS; Take 14 mg by mouth every morning (before breakfast), Disp-90 tablet, R-1Normal  2. Gastroesophageal reflux disease without esophagitis  -     omeprazole (PRILOSEC) 40 MG delayed release capsule; Take 1 capsule by mouth daily, Disp-90 capsule, R-1Normal  3. Restless legs syndrome  -     pregabalin (LYRICA) 100 MG capsule; Take 1 capsule by mouth 2 times daily for 180 days. TAKE ONE CAPSULE BY MOUTH TWICE A DAY, THE MAXIMUM DAILY AMOUNT IS 2 TABLETS Max Daily Amount: 200 mg, Disp-180 capsule, R-1Normal  -     rOPINIRole (REQUIP) 2 MG tablet; Take 1 tablet by mouth 2 times daily, Disp-180 tablet, R-1Normal  4. Generalized anxiety disorder  -     LORazepam (ATIVAN) 0.5 MG tablet; Take 1 tablet by mouth daily as needed for Anxiety for up to 30 days. Max Daily Amount: 0.5 mg, Disp-15 tablet, R-0Normal  5. Psychophysiological insomnia  6. Vaginal yeast infection  7. Overweight  8. BMI 26.0-26.9,adult    Await labs- to do in 1 month after restarting Rybelsus 14 mg; to monitor sugars and keep a log, to follow a diabetic diet.  Prescribed Diflucan for the

## 2024-02-08 ENCOUNTER — TELEPHONE (OUTPATIENT)
Dept: FAMILY MEDICINE CLINIC | Facility: CLINIC | Age: 79
End: 2024-02-08

## 2024-02-08 DIAGNOSIS — E11.65 TYPE 2 DIABETES MELLITUS WITH HYPERGLYCEMIA, WITHOUT LONG-TERM CURRENT USE OF INSULIN (HCC): ICD-10-CM

## 2024-02-08 DIAGNOSIS — E11.65 TYPE 2 DIABETES MELLITUS WITH HYPERGLYCEMIA, WITHOUT LONG-TERM CURRENT USE OF INSULIN (HCC): Primary | ICD-10-CM

## 2024-02-08 RX ORDER — ORAL SEMAGLUTIDE 7 MG/1
7 TABLET ORAL
Qty: 30 TABLET | Refills: 0 | Status: SHIPPED | OUTPATIENT
Start: 2024-02-08

## 2024-02-08 RX ORDER — PIOGLITAZONEHYDROCHLORIDE 15 MG/1
15 TABLET ORAL
Qty: 30 TABLET | Refills: 0 | Status: SHIPPED | OUTPATIENT
Start: 2024-02-08 | End: 2024-03-09

## 2024-02-08 RX ORDER — PIOGLITAZONEHYDROCHLORIDE 15 MG/1
15 TABLET ORAL
Qty: 90 TABLET | OUTPATIENT
Start: 2024-02-08 | End: 2024-03-09

## 2024-02-08 NOTE — TELEPHONE ENCOUNTER
Patient comes today with her  and says that the Rybelsus 14 mg makes her feels tired, has nausea all day, vomits around 7 pm and once more during the day.   Advised to decrease the dose of Rybelsus to 7 mg and restart Actos 15 mg after dinner (has taken Actos before without side effects).  RTC in 2 weeks for f/u Diabetes- virtual.

## 2024-02-22 ENCOUNTER — TELEMEDICINE (OUTPATIENT)
Dept: FAMILY MEDICINE CLINIC | Facility: CLINIC | Age: 79
End: 2024-02-22
Payer: MEDICARE

## 2024-02-22 ENCOUNTER — TELEPHONE (OUTPATIENT)
Dept: ORTHOPEDIC SURGERY | Age: 79
End: 2024-02-22

## 2024-02-22 DIAGNOSIS — E11.65 TYPE 2 DIABETES MELLITUS WITH HYPERGLYCEMIA, WITHOUT LONG-TERM CURRENT USE OF INSULIN (HCC): ICD-10-CM

## 2024-02-22 DIAGNOSIS — E11.9 CONTROLLED TYPE 2 DIABETES MELLITUS WITHOUT COMPLICATION, WITH LONG-TERM CURRENT USE OF INSULIN (HCC): Primary | ICD-10-CM

## 2024-02-22 DIAGNOSIS — Z79.4 CONTROLLED TYPE 2 DIABETES MELLITUS WITHOUT COMPLICATION, WITH LONG-TERM CURRENT USE OF INSULIN (HCC): Primary | ICD-10-CM

## 2024-02-22 PROCEDURE — 99442 PR PHYS/QHP TELEPHONE EVALUATION 11-20 MIN: CPT | Performed by: FAMILY MEDICINE

## 2024-02-22 RX ORDER — TRAMADOL HYDROCHLORIDE 50 MG/1
50 TABLET ORAL EVERY 4 HOURS PRN
COMMUNITY
Start: 2024-02-13 | End: 2024-02-26 | Stop reason: SDUPTHER

## 2024-02-22 RX ORDER — ORAL SEMAGLUTIDE 7 MG/1
7 TABLET ORAL
Qty: 30 TABLET | Refills: 4 | Status: SHIPPED | OUTPATIENT
Start: 2024-02-22

## 2024-02-22 ASSESSMENT — PATIENT HEALTH QUESTIONNAIRE - PHQ9
2. FEELING DOWN, DEPRESSED OR HOPELESS: 0
SUM OF ALL RESPONSES TO PHQ QUESTIONS 1-9: 0
SUM OF ALL RESPONSES TO PHQ9 QUESTIONS 1 & 2: 0
SUM OF ALL RESPONSES TO PHQ QUESTIONS 1-9: 0
1. LITTLE INTEREST OR PLEASURE IN DOING THINGS: 0

## 2024-02-22 NOTE — TELEPHONE ENCOUNTER
Her appt is March 5. She needs a refill on Tramadol to [facility] in [place] on [facility].

## 2024-02-22 NOTE — PROGRESS NOTES
Khloe Hendricks, was evaluated through a synchronous (real-time) audio encounter. The patient (or guardian if applicable) is aware that this is a billable service, which includes applicable co-pays. This Virtual Visit was conducted with patient's (and/or legal guardian's) consent. Patient identification was verified, and a caregiver was present when appropriate.   The patient was located at Home: [address] [place] [address] [address] [place] [id number]  Provider was located at Facility (Appt Dept): [address] [place] [phone]      Khloe Hendricks (:  1945) is a Established patient, presenting virtually for evaluation of the following:    Assessment & Plan   Below is the assessment and plan developed based on review of pertinent history, physical exam, labs, studies, and medications.  1. Controlled type 2 diabetes mellitus without complication, with long-term current use of insulin (HCC)  2. Type 2 diabetes mellitus with hyperglycemia, without long-term current use of insulin (HCC)  -     Semaglutide (RYBELSUS) 7 MG TABS; Take 7 mg by mouth every morning (before breakfast), Disp-30 tablet, R-4Normal    Continue current management including Rybelsus 7 mg daily, advised to not start Actos as sugars are well controlled, await HbA1c.    Return for has lab appt- on 3/1/24.       Subjective   HPI  Virtual visit for a 3 week follow up of-  Diabetes-  at last labs, HbA1c was 8.4 on 23, 7.4 in 2023; we decreased Rybelsus to 7 mg, her fasting sugars per her log are- 155- 78, with mostly normal readings of 118, 120, 180, 96, 102, 122, 130, 96, 83, 86, 98, 125, 92; had 1 reading of 68- felt a little bad- has glucose tabs handy but did not need to take, 2 hrs PP were 145, 143. We also started her on Actos but her pharmacy only got it in 2 days ago- she has not picked it up yet. She takes Metformin 1000 mg 1 tab bid, Glipizide 10 mg 2 tabs twice daily with meals, (stopped Toujeo 2 units in the

## 2024-02-26 DIAGNOSIS — S32.401A CLOSED NONDISPLACED FRACTURE OF RIGHT ACETABULUM, UNSPECIFIED PORTION OF ACETABULUM, INITIAL ENCOUNTER (HCC): Primary | ICD-10-CM

## 2024-02-26 DIAGNOSIS — M70.61 GREATER TROCHANTERIC BURSITIS OF RIGHT HIP: ICD-10-CM

## 2024-02-26 RX ORDER — TRAMADOL HYDROCHLORIDE 50 MG/1
50 TABLET ORAL EVERY 6 HOURS PRN
Qty: 12 TABLET | Refills: 0 | Status: SHIPPED | OUTPATIENT
Start: 2024-02-26 | End: 2024-02-29

## 2024-02-26 ASSESSMENT — ENCOUNTER SYMPTOMS
WHEEZING: 0
SINUS PAIN: 0
PHOTOPHOBIA: 0
SHORTNESS OF BREATH: 0
COUGH: 0
NAUSEA: 0
DIARRHEA: 0
VOMITING: 0
ABDOMINAL PAIN: 0

## 2024-02-28 ENCOUNTER — OFFICE VISIT (OUTPATIENT)
Dept: FAMILY MEDICINE CLINIC | Facility: CLINIC | Age: 79
End: 2024-02-28
Payer: MEDICARE

## 2024-02-28 VITALS
HEIGHT: 62 IN | RESPIRATION RATE: 16 BRPM | TEMPERATURE: 97.2 F | SYSTOLIC BLOOD PRESSURE: 110 MMHG | HEART RATE: 94 BPM | BODY MASS INDEX: 26.48 KG/M2 | DIASTOLIC BLOOD PRESSURE: 62 MMHG | OXYGEN SATURATION: 96 % | WEIGHT: 143.9 LBS

## 2024-02-28 DIAGNOSIS — Z00.00 MEDICARE ANNUAL WELLNESS VISIT, SUBSEQUENT: Primary | ICD-10-CM

## 2024-02-28 PROCEDURE — 1123F ACP DISCUSS/DSCN MKR DOCD: CPT | Performed by: FAMILY MEDICINE

## 2024-02-28 PROCEDURE — G8484 FLU IMMUNIZE NO ADMIN: HCPCS | Performed by: FAMILY MEDICINE

## 2024-02-28 PROCEDURE — G0439 PPPS, SUBSEQ VISIT: HCPCS | Performed by: FAMILY MEDICINE

## 2024-02-28 ASSESSMENT — PATIENT HEALTH QUESTIONNAIRE - PHQ9
SUM OF ALL RESPONSES TO PHQ QUESTIONS 1-9: 0
2. FEELING DOWN, DEPRESSED OR HOPELESS: 0
1. LITTLE INTEREST OR PLEASURE IN DOING THINGS: 0
SUM OF ALL RESPONSES TO PHQ QUESTIONS 1-9: 0
SUM OF ALL RESPONSES TO PHQ9 QUESTIONS 1 & 2: 0
SUM OF ALL RESPONSES TO PHQ QUESTIONS 1-9: 0
SUM OF ALL RESPONSES TO PHQ QUESTIONS 1-9: 0

## 2024-02-28 ASSESSMENT — LIFESTYLE VARIABLES
HOW OFTEN DO YOU HAVE A DRINK CONTAINING ALCOHOL: NEVER
HOW MANY STANDARD DRINKS CONTAINING ALCOHOL DO YOU HAVE ON A TYPICAL DAY: PATIENT DOES NOT DRINK

## 2024-02-28 NOTE — PATIENT INSTRUCTIONS
if you think you are having a problem with your medicine.     If your doctor recommends aspirin, take the amount directed each day. Make sure you take aspirin and not another kind of pain reliever, such as acetaminophen (Tylenol).   When should you call for help?   Call 911 if you have symptoms of a heart attack. These may include:    Chest pain or pressure, or a strange feeling in the chest.     Sweating.     Shortness of breath.     Pain, pressure, or a strange feeling in the back, neck, jaw, or upper belly or in one or both shoulders or arms.     Lightheadedness or sudden weakness.     A fast or irregular heartbeat.   After you call 911, the  may tell you to chew 1 adult-strength or 2 to 4 low-dose aspirin. Wait for an ambulance. Do not try to drive yourself.  Watch closely for changes in your health, and be sure to contact your doctor if you have any problems.  Where can you learn more?  Go to https://www.[employer].net/patientEd and enter F075 to learn more about \"A Healthy Heart: Care Instructions.\"  Current as of: June 25, 2023               Content Version: 13.9  © 8202-7843 [employer].   Care instructions adapted under license by [employer]. If you have questions about a medical condition or this instruction, always ask your healthcare professional. [employer] disclaims any warranty or liability for your use of this information.      Personalized Preventive Plan for Khloe Hendricks - 2/28/2024  Medicare offers a range of preventive health benefits. Some of the tests and screenings are paid in full while other may be subject to a deductible, co-insurance, and/or copay.    Some of these benefits include a comprehensive review of your medical history including lifestyle, illnesses that may run in your family, and various assessments and screenings as appropriate.    After reviewing your medical record and screening and assessments performed today your provider may

## 2024-02-28 NOTE — PROGRESS NOTES
Medicare Annual Wellness Visit    Khloe Hendricks is here for Medicare AWV    Assessment & Plan   Medicare annual wellness visit, subsequent  Recommendations for Preventive Services Due: see orders and patient instructions/AVS.  Recommended screening schedule for the next 5-10 years is provided to the patient in written form: see Patient Instructions/AVS.   Patient does have a living will, Healthcare POA and Advance directive. Advised patient to bring a copy of the same to their chart - patient understands and agrees; they were given the forms again today.  She takes the flu vaccine every year in the fall- took on 10/17/23; Pneumovax 23 on 8/9/2016, Prevnar 13 on 10/26/2015, says she took the Varivax vaccine several years ago; Shingrix vaccine on 8/8/23- says she got both doses- advised to call us with the dates; TDaP on 4/21/22. She took the Covid vaccines on 1/24/21, 2/22/21, 10/25/21, 4/21/22 - advised to take another booster and the RSV vaccine.  Her last colonoscopy was on 6/14/17- says it was good; her last mammogram was on 8/13/18- patient refuses to do either of them and has aged out, denies any associated symptoms; denies any family h/o colon or breast cancer.  Her last bone density was on 10/13/2020- was normal; patient refuses today.    Advised to take the Requip 2 mg 2 tab po in the late evening daily and see how the restless legs do.  All instructions were written out for the patient.    Return for Medicare Annual Wellness Visit in 1 year.     Subjective       Patient's complete Health Risk Assessment and screening values have been reviewed and are found in Flowsheets. The following problems were reviewed today and where indicated follow up appointments were made and/or referrals ordered.    Positive Risk Factor Screenings with Interventions:            Controlled Medication Review:      Today's Pain Level: No data recorded   Opioid Risk: (Low risk score <55) Opioid risk score: 14    Patient is low

## 2024-03-01 ENCOUNTER — TELEPHONE (OUTPATIENT)
Dept: FAMILY MEDICINE CLINIC | Facility: CLINIC | Age: 79
End: 2024-03-01

## 2024-03-01 ENCOUNTER — HOSPITAL ENCOUNTER (OUTPATIENT)
Age: 79
Discharge: HOME | End: 2024-03-04

## 2024-03-01 DIAGNOSIS — E11.65 TYPE 2 DIABETES MELLITUS WITH HYPERGLYCEMIA, WITHOUT LONG-TERM CURRENT USE OF INSULIN (HCC): Primary | ICD-10-CM

## 2024-03-01 DIAGNOSIS — E11.65 TYPE 2 DIABETES MELLITUS WITH HYPERGLYCEMIA, WITHOUT LONG-TERM CURRENT USE OF INSULIN (HCC): ICD-10-CM

## 2024-03-01 DIAGNOSIS — E78.5 HYPERLIPIDEMIA, UNSPECIFIED HYPERLIPIDEMIA TYPE: ICD-10-CM

## 2024-03-01 LAB
ALBUMIN SERPL-MCNC: 3.7 G/DL (ref 3.2–4.6)
ALBUMIN/GLOB SERPL: 1.2 (ref 0.4–1.6)
ALP SERPL-CCNC: 58 U/L (ref 50–136)
ALT SERPL-CCNC: 17 U/L (ref 12–65)
ANION GAP SERPL CALC-SCNC: 5 MMOL/L (ref 2–11)
AST SERPL-CCNC: 7 U/L (ref 15–37)
BASOPHILS # BLD: 0.1 K/UL (ref 0–0.2)
BASOPHILS NFR BLD: 1 % (ref 0–2)
BILIRUB SERPL-MCNC: 0.4 MG/DL (ref 0.2–1.1)
BUN SERPL-MCNC: 8 MG/DL (ref 8–23)
CALCIUM SERPL-MCNC: 9.1 MG/DL (ref 8.3–10.4)
CHLORIDE SERPL-SCNC: 106 MMOL/L (ref 103–113)
CHOLEST SERPL-MCNC: 117 MG/DL
CO2 SERPL-SCNC: 28 MMOL/L (ref 21–32)
CREAT SERPL-MCNC: 0.5 MG/DL (ref 0.6–1)
CREAT UR-MCNC: 150 MG/DL
DIFFERENTIAL METHOD BLD: ABNORMAL
EOSINOPHIL # BLD: 0 K/UL (ref 0–0.8)
EOSINOPHIL NFR BLD: 0 % (ref 0.5–7.8)
ERYTHROCYTE [DISTWIDTH] IN BLOOD BY AUTOMATED COUNT: 13.2 % (ref 11.9–14.6)
EST. AVERAGE GLUCOSE BLD GHB EST-MCNC: 186 MG/DL
GLOBULIN SER CALC-MCNC: 3 G/DL (ref 2.8–4.5)
GLUCOSE SERPL-MCNC: 182 MG/DL (ref 65–100)
HBA1C MFR BLD: 8.1 % (ref 4.8–5.6)
HCT VFR BLD AUTO: 35.9 % (ref 35.8–46.3)
HDLC SERPL-MCNC: 50 MG/DL (ref 40–60)
HDLC SERPL: 2.3
HGB BLD-MCNC: 11.5 G/DL (ref 11.7–15.4)
IMM GRANULOCYTES # BLD AUTO: 0 K/UL (ref 0–0.5)
IMM GRANULOCYTES NFR BLD AUTO: 0 % (ref 0–5)
LDLC SERPL CALC-MCNC: 47 MG/DL
LYMPHOCYTES # BLD: 1.1 K/UL (ref 0.5–4.6)
LYMPHOCYTES NFR BLD: 18 % (ref 13–44)
MCH RBC QN AUTO: 32.3 PG (ref 26.1–32.9)
MCHC RBC AUTO-ENTMCNC: 32 G/DL (ref 31.4–35)
MCV RBC AUTO: 100.8 FL (ref 82–102)
MICROALBUMIN UR-MCNC: 2.3 MG/DL
MICROALBUMIN/CREAT UR-RTO: 15 MG/G (ref 0–30)
MONOCYTES # BLD: 0.6 K/UL (ref 0.1–1.3)
MONOCYTES NFR BLD: 10 % (ref 4–12)
NEUTS SEG # BLD: 4.2 K/UL (ref 1.7–8.2)
NEUTS SEG NFR BLD: 71 % (ref 43–78)
NRBC # BLD: 0 K/UL (ref 0–0.2)
PLATELET # BLD AUTO: 249 K/UL (ref 150–450)
PMV BLD AUTO: 10.6 FL (ref 9.4–12.3)
POTASSIUM SERPL-SCNC: 3.8 MMOL/L (ref 3.5–5.1)
PROT SERPL-MCNC: 6.7 G/DL (ref 6.3–8.2)
RBC # BLD AUTO: 3.56 M/UL (ref 4.05–5.2)
SODIUM SERPL-SCNC: 139 MMOL/L (ref 136–146)
TRIGL SERPL-MCNC: 100 MG/DL (ref 35–150)
VLDLC SERPL CALC-MCNC: 20 MG/DL (ref 6–23)
WBC # BLD AUTO: 5.9 K/UL (ref 4.3–11.1)

## 2024-03-14 ENCOUNTER — TELEPHONE (OUTPATIENT)
Dept: FAMILY MEDICINE CLINIC | Facility: CLINIC | Age: 79
End: 2024-03-14

## 2024-03-14 DIAGNOSIS — E11.65 TYPE 2 DIABETES MELLITUS WITH HYPERGLYCEMIA, WITHOUT LONG-TERM CURRENT USE OF INSULIN (HCC): ICD-10-CM

## 2024-03-14 RX ORDER — GLUCOSAMINE HCL/CHONDROITIN SU 500-400 MG
CAPSULE ORAL
Qty: 100 STRIP | Refills: 3 | Status: SHIPPED | OUTPATIENT
Start: 2024-03-14

## 2024-03-14 RX ORDER — BLOOD-GLUCOSE METER
KIT MISCELLANEOUS
Qty: 1 KIT | Refills: 0 | Status: SHIPPED | OUTPATIENT
Start: 2024-03-14

## 2024-03-14 RX ORDER — LANCETS 30 GAUGE
EACH MISCELLANEOUS
Qty: 100 EACH | Refills: 3 | Status: SHIPPED | OUTPATIENT
Start: 2024-03-14

## 2024-03-14 RX ORDER — PIOGLITAZONEHYDROCHLORIDE 15 MG/1
15 TABLET ORAL DAILY
Qty: 30 TABLET | Refills: 0 | Status: SHIPPED | OUTPATIENT
Start: 2024-03-14

## 2024-03-15 RX ORDER — PIOGLITAZONEHYDROCHLORIDE 15 MG/1
15 TABLET ORAL DAILY
Qty: 90 TABLET | OUTPATIENT
Start: 2024-03-15

## 2024-03-18 ENCOUNTER — TELEPHONE (OUTPATIENT)
Dept: FAMILY MEDICINE CLINIC | Facility: CLINIC | Age: 79
End: 2024-03-18

## 2024-03-18 NOTE — TELEPHONE ENCOUNTER
Patient called and stated that the prescription for the new blood glucose monitor did not match the scripts that was ordered. She also wants to know when is the best time to take her ACTOS.

## 2024-03-19 NOTE — TELEPHONE ENCOUNTER
I sent in generic prescriptions for the Glucometer, strips and lancets. Please ask her to tell the pharmacist to provide supplies that work in the glucometer.  Also advise her to take Actos after dinner.

## 2024-03-28 ENCOUNTER — TELEMEDICINE (OUTPATIENT)
Dept: FAMILY MEDICINE CLINIC | Facility: CLINIC | Age: 79
End: 2024-03-28

## 2024-03-28 DIAGNOSIS — F41.1 GENERALIZED ANXIETY DISORDER: ICD-10-CM

## 2024-03-28 DIAGNOSIS — F51.04 PSYCHOPHYSIOLOGICAL INSOMNIA: ICD-10-CM

## 2024-03-28 DIAGNOSIS — E11.65 TYPE 2 DIABETES MELLITUS WITH HYPERGLYCEMIA, WITHOUT LONG-TERM CURRENT USE OF INSULIN (HCC): Primary | ICD-10-CM

## 2024-03-28 RX ORDER — LORAZEPAM 0.5 MG/1
0.5 TABLET ORAL EVERY 8 HOURS PRN
Qty: 15 TABLET | Refills: 0 | Status: SHIPPED | OUTPATIENT
Start: 2024-03-28 | End: 2024-04-27

## 2024-03-28 RX ORDER — PIOGLITAZONEHYDROCHLORIDE 15 MG/1
15 TABLET ORAL DAILY
Qty: 90 TABLET | Refills: 1 | Status: SHIPPED | OUTPATIENT
Start: 2024-03-28

## 2024-03-28 ASSESSMENT — ENCOUNTER SYMPTOMS
SHORTNESS OF BREATH: 0
WHEEZING: 0
SORE THROAT: 0
NAUSEA: 0
VOMITING: 0
ABDOMINAL PAIN: 0
PHOTOPHOBIA: 0
DIARRHEA: 0
COUGH: 0

## 2024-03-28 ASSESSMENT — PATIENT HEALTH QUESTIONNAIRE - PHQ9
6. FEELING BAD ABOUT YOURSELF - OR THAT YOU ARE A FAILURE OR HAVE LET YOURSELF OR YOUR FAMILY DOWN: NOT AT ALL
SUM OF ALL RESPONSES TO PHQ QUESTIONS 1-9: 6
9. THOUGHTS THAT YOU WOULD BE BETTER OFF DEAD, OR OF HURTING YOURSELF: NOT AT ALL
8. MOVING OR SPEAKING SO SLOWLY THAT OTHER PEOPLE COULD HAVE NOTICED. OR THE OPPOSITE, BEING SO FIGETY OR RESTLESS THAT YOU HAVE BEEN MOVING AROUND A LOT MORE THAN USUAL: NOT AT ALL
1. LITTLE INTEREST OR PLEASURE IN DOING THINGS: NOT AT ALL
2. FEELING DOWN, DEPRESSED OR HOPELESS: NEARLY EVERY DAY
SUM OF ALL RESPONSES TO PHQ QUESTIONS 1-9: 6
10. IF YOU CHECKED OFF ANY PROBLEMS, HOW DIFFICULT HAVE THESE PROBLEMS MADE IT FOR YOU TO DO YOUR WORK, TAKE CARE OF THINGS AT HOME, OR GET ALONG WITH OTHER PEOPLE: NOT DIFFICULT AT ALL
7. TROUBLE CONCENTRATING ON THINGS, SUCH AS READING THE NEWSPAPER OR WATCHING TELEVISION: NOT AT ALL
5. POOR APPETITE OR OVEREATING: NEARLY EVERY DAY
3. TROUBLE FALLING OR STAYING ASLEEP: NOT AT ALL
SUM OF ALL RESPONSES TO PHQ9 QUESTIONS 1 & 2: 3
4. FEELING TIRED OR HAVING LITTLE ENERGY: NOT AT ALL

## 2024-03-28 NOTE — PROGRESS NOTES
Khloe Hendricks, was evaluated through a synchronous (real-time) audio encounter. The patient (or guardian if applicable) is aware that this is a billable service, which includes applicable co-pays. This Virtual Visit was conducted with patient's (and/or legal guardian's) consent. Patient identification was verified, and a caregiver was present when appropriate.   The patient was located at Home: [address] [place] [address] [address] [place] [address]  Provider was located at Facility (Appt Dept): [address] [place] [address],  [place] [address]      Khloe Hendricks (:  1945) is a Established patient, presenting virtually for evaluation of the following:    Assessment & Plan   Below is the assessment and plan developed based on review of pertinent history, physical exam, labs, studies, and medications.  1. Type 2 diabetes mellitus with hyperglycemia, without long-term current use of insulin (HCC)  -     pioglitazone (ACTOS) 15 MG tablet; Take 1 tablet by mouth daily, Disp-90 tablet, R-1Normal  -     Hemoglobin A1C; Future  -     Comprehensive Metabolic Panel; Future  2. Generalized anxiety disorder  -     LORazepam (ATIVAN) 0.5 MG tablet; Take 1 tablet by mouth every 8 hours as needed for Anxiety for up to 30 days. Max Daily Amount: 1.5 mg, Disp-15 tablet, R-0Normal  3. Psychophysiological insomnia    Continue current management of Diabetes including Actos - sugars appear well controlled, repeat labs in 3 months.  I have reviewed the patient’s controlled substance prescription history, as maintained in the [place] prescription monitoring program, so that the prescription(s) for a  controlled substance can be given. Refilled Lorazepam 0.5 mg for 15 tabs to use prn for severe anxiety. UDS and CSA were done on 23.     Return for 3 mths- fasting labs.       Subjective   HPI  Virtual visit for a 4 week follow up of-  Diabetes-  at last labs, HbA1c was 8.1 on 3/1/24, 8.4 on 23, 7.4 in 2023; her

## 2024-03-29 ENCOUNTER — OFFICE VISIT (OUTPATIENT)
Dept: ORTHOPEDIC SURGERY | Age: 79
End: 2024-03-29
Payer: MEDICARE

## 2024-03-29 DIAGNOSIS — M54.16 LUMBAR RADICULOPATHY: ICD-10-CM

## 2024-03-29 DIAGNOSIS — M47.816 LUMBAR SPONDYLOSIS: Primary | ICD-10-CM

## 2024-03-29 PROCEDURE — G8484 FLU IMMUNIZE NO ADMIN: HCPCS | Performed by: PHYSICIAN ASSISTANT

## 2024-03-29 PROCEDURE — G8428 CUR MEDS NOT DOCUMENT: HCPCS | Performed by: PHYSICIAN ASSISTANT

## 2024-03-29 PROCEDURE — 1036F TOBACCO NON-USER: CPT | Performed by: PHYSICIAN ASSISTANT

## 2024-03-29 PROCEDURE — G8417 CALC BMI ABV UP PARAM F/U: HCPCS | Performed by: PHYSICIAN ASSISTANT

## 2024-03-29 PROCEDURE — 99214 OFFICE O/P EST MOD 30 MIN: CPT | Performed by: PHYSICIAN ASSISTANT

## 2024-03-29 PROCEDURE — 1123F ACP DISCUSS/DSCN MKR DOCD: CPT | Performed by: PHYSICIAN ASSISTANT

## 2024-03-29 PROCEDURE — 1090F PRES/ABSN URINE INCON ASSESS: CPT | Performed by: PHYSICIAN ASSISTANT

## 2024-03-29 PROCEDURE — G8399 PT W/DXA RESULTS DOCUMENT: HCPCS | Performed by: PHYSICIAN ASSISTANT

## 2024-03-29 RX ORDER — TRAMADOL HYDROCHLORIDE 50 MG/1
50 TABLET ORAL EVERY 6 HOURS PRN
Qty: 20 TABLET | Refills: 0 | Status: SHIPPED | OUTPATIENT
Start: 2024-03-29 | End: 2024-04-03

## 2024-03-29 NOTE — PROGRESS NOTES
03/29/24        Name: Khloe Hendricks  YOB: 1945  Gender: female  MRN: [id number]    CC: Follow-up       HPI: Khloe Hendricks is a 78 y.o. female who returns for Follow-up         Patient returns the office today for follow-up.  She has had multiple issues come up in the last several months.  We have been treating her for back pain and radicular pain to the right leg, she last underwent a right L4-5 facet, right L5 and right S1 selective nerve root block with Dr. Arreguin in November 2023.  She seemed to have fair relief from this for a few months, but she notes it was not significantly long in terms of her relief.  However since that time she has sustained pelvic fractures and also been treated for right hip bursitis by Dr. Stiles.  She is now walking with a cane and not needing a walker at this point.  However she is still having pain in the right hip and leg and lower back.  But her biggest issue appears to be her right knee.  She has a history of total knee replacement with Dr. Lopes 5 years ago, but unfortunately he sent her to therapy a few times but is refused to do any other interventional treatment.  She would like to be evaluated for her right knee issues that she feels it is her primary problem.  She also notes that she has discovered that every time she gets a steroid injection regardless of the type, that she develops a yeast infection requiring medication from her primary care provider.    History was obtained by patient      Meds/PSH/PMH/FH/SH: This information has been reviewed.      ALLERGIES: No Known Allergies           Physical Examination:    Ambulate with a cane, minimal limp.        Imaging:         MRI Result (most recent):  MRI HIP RIGHT WO CONTRAST 11/07/2023    Narrative  MRI OF THE RIGHT HIP WITHOUT CONTRAST.    Clinical Indication: Right groin and hip pain, lumbar radiculopathy    Procedure: Multiplanar and multisequence MR images performed of the right

## 2024-05-08 ENCOUNTER — OFFICE VISIT (OUTPATIENT)
Dept: ORTHOPEDIC SURGERY | Age: 79
End: 2024-05-08
Payer: MEDICARE

## 2024-05-08 DIAGNOSIS — Z96.651 PRESENCE OF TOTAL KNEE JOINT PROSTHESIS, RIGHT: ICD-10-CM

## 2024-05-08 DIAGNOSIS — M25.561 ACUTE PAIN OF RIGHT KNEE: Primary | ICD-10-CM

## 2024-05-08 DIAGNOSIS — Z96.651 COMPLICATION OF INTERNAL RIGHT KNEE PROSTHESIS, UNSPECIFIED COMPLICATION, SUBSEQUENT ENCOUNTER: ICD-10-CM

## 2024-05-08 DIAGNOSIS — T84.9XXD COMPLICATION OF INTERNAL RIGHT KNEE PROSTHESIS, UNSPECIFIED COMPLICATION, SUBSEQUENT ENCOUNTER: ICD-10-CM

## 2024-05-08 PROCEDURE — G8399 PT W/DXA RESULTS DOCUMENT: HCPCS | Performed by: ORTHOPAEDIC SURGERY

## 2024-05-08 PROCEDURE — G8417 CALC BMI ABV UP PARAM F/U: HCPCS | Performed by: ORTHOPAEDIC SURGERY

## 2024-05-08 PROCEDURE — 1036F TOBACCO NON-USER: CPT | Performed by: ORTHOPAEDIC SURGERY

## 2024-05-08 PROCEDURE — G8428 CUR MEDS NOT DOCUMENT: HCPCS | Performed by: ORTHOPAEDIC SURGERY

## 2024-05-08 PROCEDURE — 1090F PRES/ABSN URINE INCON ASSESS: CPT | Performed by: ORTHOPAEDIC SURGERY

## 2024-05-08 PROCEDURE — 99214 OFFICE O/P EST MOD 30 MIN: CPT | Performed by: ORTHOPAEDIC SURGERY

## 2024-05-08 PROCEDURE — 1123F ACP DISCUSS/DSCN MKR DOCD: CPT | Performed by: ORTHOPAEDIC SURGERY

## 2024-06-05 ENCOUNTER — TELEPHONE (OUTPATIENT)
Dept: FAMILY MEDICINE CLINIC | Facility: CLINIC | Age: 79
End: 2024-06-05

## 2024-06-05 ENCOUNTER — OFFICE VISIT (OUTPATIENT)
Dept: FAMILY MEDICINE CLINIC | Facility: CLINIC | Age: 79
End: 2024-06-05
Payer: MEDICARE

## 2024-06-05 VITALS
OXYGEN SATURATION: 97 % | RESPIRATION RATE: 16 BRPM | HEART RATE: 72 BPM | DIASTOLIC BLOOD PRESSURE: 70 MMHG | TEMPERATURE: 97.6 F | WEIGHT: 148.7 LBS | BODY MASS INDEX: 27.36 KG/M2 | SYSTOLIC BLOOD PRESSURE: 128 MMHG | HEIGHT: 62 IN

## 2024-06-05 DIAGNOSIS — R20.0 NUMBNESS AND TINGLING OF BOTH UPPER EXTREMITIES: ICD-10-CM

## 2024-06-05 DIAGNOSIS — R20.2 NUMBNESS AND TINGLING OF BOTH UPPER EXTREMITIES: ICD-10-CM

## 2024-06-05 DIAGNOSIS — I10 PRIMARY HYPERTENSION: Primary | ICD-10-CM

## 2024-06-05 PROCEDURE — 1090F PRES/ABSN URINE INCON ASSESS: CPT | Performed by: FAMILY MEDICINE

## 2024-06-05 PROCEDURE — G8417 CALC BMI ABV UP PARAM F/U: HCPCS | Performed by: FAMILY MEDICINE

## 2024-06-05 PROCEDURE — 1036F TOBACCO NON-USER: CPT | Performed by: FAMILY MEDICINE

## 2024-06-05 PROCEDURE — G8427 DOCREV CUR MEDS BY ELIG CLIN: HCPCS | Performed by: FAMILY MEDICINE

## 2024-06-05 PROCEDURE — G8399 PT W/DXA RESULTS DOCUMENT: HCPCS | Performed by: FAMILY MEDICINE

## 2024-06-05 PROCEDURE — 3074F SYST BP LT 130 MM HG: CPT | Performed by: FAMILY MEDICINE

## 2024-06-05 PROCEDURE — 99214 OFFICE O/P EST MOD 30 MIN: CPT | Performed by: FAMILY MEDICINE

## 2024-06-05 PROCEDURE — 3078F DIAST BP <80 MM HG: CPT | Performed by: FAMILY MEDICINE

## 2024-06-05 PROCEDURE — 1123F ACP DISCUSS/DSCN MKR DOCD: CPT | Performed by: FAMILY MEDICINE

## 2024-06-05 RX ORDER — LISINOPRIL 20 MG/1
20 TABLET ORAL DAILY
Qty: 30 TABLET | Refills: 0 | Status: SHIPPED | OUTPATIENT
Start: 2024-06-05

## 2024-06-05 ASSESSMENT — PATIENT HEALTH QUESTIONNAIRE - PHQ9
2. FEELING DOWN, DEPRESSED OR HOPELESS: NOT AT ALL
SUM OF ALL RESPONSES TO PHQ9 QUESTIONS 1 & 2: 0
SUM OF ALL RESPONSES TO PHQ QUESTIONS 1-9: 0
1. LITTLE INTEREST OR PLEASURE IN DOING THINGS: NOT AT ALL
SUM OF ALL RESPONSES TO PHQ QUESTIONS 1-9: 0

## 2024-06-05 ASSESSMENT — ENCOUNTER SYMPTOMS
ABDOMINAL PAIN: 0
BACK PAIN: 1
NAUSEA: 0
SINUS PAIN: 0
WHEEZING: 0
DIARRHEA: 0
SHORTNESS OF BREATH: 0
VOMITING: 0
PHOTOPHOBIA: 0
COUGH: 0

## 2024-06-05 NOTE — TELEPHONE ENCOUNTER
Patient called in through the nurse triage line with on and off high blood pressure readings, no available appointments this week until Monday June the 10th. Offered for patient to be scheduled with one of the other providers to be seen this week but patient declined and said she will come on Monday. Let patient know if worsen and can not wait till Monday to go and be seen at the urgent care or ED due to the wait period.

## 2024-06-27 ENCOUNTER — HOSPITAL ENCOUNTER (OUTPATIENT)
Age: 79
Discharge: HOME | End: 2024-06-30

## 2024-06-27 DIAGNOSIS — E11.65 TYPE 2 DIABETES MELLITUS WITH HYPERGLYCEMIA, WITHOUT LONG-TERM CURRENT USE OF INSULIN (HCC): ICD-10-CM

## 2024-06-27 LAB
ALBUMIN SERPL-MCNC: 3.9 G/DL (ref 3.2–4.6)
ALBUMIN/GLOB SERPL: 1.8 (ref 1–1.9)
ALP SERPL-CCNC: 58 U/L (ref 35–104)
ALT SERPL-CCNC: 14 U/L (ref 12–65)
ANION GAP SERPL CALC-SCNC: 9 MMOL/L (ref 9–18)
AST SERPL-CCNC: 20 U/L (ref 15–37)
BILIRUB SERPL-MCNC: 0.2 MG/DL (ref 0–1.2)
BUN SERPL-MCNC: 11 MG/DL (ref 8–23)
CALCIUM SERPL-MCNC: 8.9 MG/DL (ref 8.8–10.2)
CHLORIDE SERPL-SCNC: 105 MMOL/L (ref 98–107)
CO2 SERPL-SCNC: 26 MMOL/L (ref 20–28)
CREAT SERPL-MCNC: 0.53 MG/DL (ref 0.6–1.1)
EST. AVERAGE GLUCOSE BLD GHB EST-MCNC: 176 MG/DL
GLOBULIN SER CALC-MCNC: 2.2 G/DL (ref 2.3–3.5)
GLUCOSE SERPL-MCNC: 111 MG/DL (ref 70–99)
HBA1C MFR BLD: 7.8 % (ref 0–5.6)
POTASSIUM SERPL-SCNC: 4.2 MMOL/L (ref 3.5–5.1)
PROT SERPL-MCNC: 6.1 G/DL (ref 6.3–8.2)
SODIUM SERPL-SCNC: 139 MMOL/L (ref 136–145)

## 2024-07-02 ENCOUNTER — OFFICE VISIT (OUTPATIENT)
Dept: FAMILY MEDICINE CLINIC | Facility: CLINIC | Age: 79
End: 2024-07-02

## 2024-07-02 VITALS
HEART RATE: 77 BPM | HEIGHT: 62 IN | WEIGHT: 147.3 LBS | BODY MASS INDEX: 27.1 KG/M2 | DIASTOLIC BLOOD PRESSURE: 68 MMHG | OXYGEN SATURATION: 99 % | TEMPERATURE: 97.4 F | SYSTOLIC BLOOD PRESSURE: 118 MMHG | RESPIRATION RATE: 16 BRPM

## 2024-07-02 DIAGNOSIS — R20.0 NUMBNESS AND TINGLING OF BOTH UPPER EXTREMITIES: ICD-10-CM

## 2024-07-02 DIAGNOSIS — E11.65 TYPE 2 DIABETES MELLITUS WITH HYPERGLYCEMIA, WITHOUT LONG-TERM CURRENT USE OF INSULIN (HCC): Primary | Chronic | ICD-10-CM

## 2024-07-02 DIAGNOSIS — R20.2 NUMBNESS AND TINGLING OF BOTH UPPER EXTREMITIES: ICD-10-CM

## 2024-07-02 DIAGNOSIS — I10 PRIMARY HYPERTENSION: Chronic | ICD-10-CM

## 2024-07-02 RX ORDER — LISINOPRIL 20 MG/1
20 TABLET ORAL DAILY
Qty: 30 TABLET | Refills: 0 | Status: SHIPPED | OUTPATIENT
Start: 2024-07-02

## 2024-07-02 RX ORDER — METHYLPREDNISOLONE 4 MG/1
TABLET ORAL
Qty: 1 KIT | Refills: 0 | Status: SHIPPED | OUTPATIENT
Start: 2024-07-02

## 2024-07-02 ASSESSMENT — ENCOUNTER SYMPTOMS
VOMITING: 0
SINUS PAIN: 0
DIARRHEA: 0
COUGH: 0
PHOTOPHOBIA: 0
ABDOMINAL PAIN: 0
NAUSEA: 0
SHORTNESS OF BREATH: 0
WHEEZING: 0
BACK PAIN: 1

## 2024-07-02 ASSESSMENT — PATIENT HEALTH QUESTIONNAIRE - PHQ9
1. LITTLE INTEREST OR PLEASURE IN DOING THINGS: NOT AT ALL
2. FEELING DOWN, DEPRESSED OR HOPELESS: NOT AT ALL
SUM OF ALL RESPONSES TO PHQ QUESTIONS 1-9: 0
SUM OF ALL RESPONSES TO PHQ9 QUESTIONS 1 & 2: 0
SUM OF ALL RESPONSES TO PHQ QUESTIONS 1-9: 0

## 2024-07-02 NOTE — PROGRESS NOTES
Khloe Hendricks (:  1945) is a 79 y.o. female,Established patient, here for evaluation of the following chief complaint(s):  Hypertension      Assessment & Plan   1. Type 2 diabetes mellitus with hyperglycemia, without long-term current use of insulin (HCC)  -     Semaglutide 14 MG TABS; Take 14 mg by mouth every morning (before breakfast), Disp-30 tablet, R-0Normal  2. Numbness and tingling of both upper extremities  -     methylPREDNISolone (MEDROL DOSEPACK) 4 MG tablet; Take by mouth with meals, Disp-1 kit, R-0Normal  3. Primary hypertension  -     lisinopril (PRINIVIL;ZESTRIL) 20 MG tablet; Take 1 tablet by mouth daily, Disp-30 tablet, R-0Normal      HTN- well controlled- continue current management.  Numbness and tingling in right UE- relatively new diagnosis; start Medrol dose pack- discussed how to take and with meals- to call us in 1 week and let us know how she is doing; discussed doing CT or MRI neck if not better, consider referral to Neurology.  Diabetes- better, advised to DC Actos, increased dose of Rybelsus to 14 mg daily- per patient's request- she wants to try this dose again, continue rest of the management, discussed last labs today.    Return in about 2 weeks (around 2024) for f/u Diabetes, tingling numbness in right UE.       Subjective   Hypertension  Pertinent negatives include no chest pain, neck pain, palpitations or shortness of breath.     Patient comes today for a 4 week follow up of-  Hypertension- relatively new diagnosis, we increased Lisinopril to 20 mg, says BP are 110/65- 125/75, good with avoiding salt.     Tingling, numbness in right upper extremity- Says she had a BP of 169/85 towards early morning about 6- 7 weeks ago- she woke up with numbness over her entire left hand- resolved in 30 mins. She does not use much salt in her diet for dinner and breakfast; eats food from [employer] for lunch. Her right upper extremity has been numb and tingling for 4

## 2024-07-08 ENCOUNTER — OFFICE VISIT (OUTPATIENT)
Age: 79
End: 2024-07-08
Payer: MEDICARE

## 2024-07-08 DIAGNOSIS — M70.61 GREATER TROCHANTERIC BURSITIS OF RIGHT HIP: Primary | ICD-10-CM

## 2024-07-08 DIAGNOSIS — M76.31 ILIOTIBIAL BAND SYNDROME OF RIGHT SIDE: ICD-10-CM

## 2024-07-08 PROCEDURE — 99204 OFFICE O/P NEW MOD 45 MIN: CPT | Performed by: ANESTHESIOLOGY

## 2024-07-08 PROCEDURE — 1123F ACP DISCUSS/DSCN MKR DOCD: CPT | Performed by: ANESTHESIOLOGY

## 2024-07-08 NOTE — PROGRESS NOTES
Previous interventions:  Procedure Date Results   Prior R knee replacement 10/2/19                                    Previous medication trials: Listed:  Class Medication Results   NSAIDs ***    Oral steroids     Tylenol     Antiepileptics lyrica    Antidepressants     Relaxants tizanidine    Topicals/transdermaI patches Voltaren gel    Narcotics       Previous tests/studies:  Study Date Results   XR Right knee 5/8/24 AP standing, flexion lateral, and sunrise view of the right knee was obtained  This demonstrated  Well-positioned total knee arthroplasty with stable fixation and this is a [employer] implant with a all polyethylene tibial component.  This appears to be a cruciate retaining construct based on the lateral view.  I do not see any evidence of loosening or malalignment. .    Radiographic impression:  Stable right total knee arthroplasty as described    MRI Lumbar spine 10/25/23 STUDY: MRI LUMBAR SPINE WO CONTRAST [id number]      STUDY DATE: 10/25/2023 2:14 PM      HISTORY: Radiculopathy, lumbar region      COMPARISON:  Lumbar spine radiographs performed August 11, 2023.     TECHNIQUE: Multisequence, multiplanar MR images were obtained of the lumbar  spine without the administration of intravenous contrast.        CONTRAST: None.     FINDINGS:     SPINAL CORD: Normal morphology. The conus medullaris terminates at the L2. No  suspicious focal abnormality of the cauda equina nerve roots.     NUMBERING: Last fully formed disc space is designatedL5/S1.     BONES: Vertebral body stature maintained. No findings of acute fracture or  significant bone marrow edema like signal. Probable intraosseous hemangioma is  noted within the L1 vertebral body and L3 vertebral body.      DISCS:  Mild disc height loss at L5-S1.     ALIGNMENT: Minimal anterolisthesis of L4 on L5 and minimal retrolisthesis of L5  on S1.      SOFT TISSUES: The aorta is normal in course and caliber. No suspicious  incidental soft tissue 
                              Previous therapies:  Type of Therapy Date Results   Physical Therapy - [facility] 2020 No benefit                                                       Opioid Monitoring:  Last UDS (results): N/A (N/A) Opioid agreement signed: N/A  [facility] database: evaluated today, appropriate Compliance issues: N/A  Last opioid dose change: N/A    Opioid Risk Tool Score (low/mod/high}:    Opioid Risk Tool Female I Male   Family history of substance abuse     Alcohol 1 3   Illegal drugs 2 3   Prescription drugs 4 4   Personal history of substance abuse     Alcohol 3 3   Illegal drugs 4 4   Prescription drugs 5 5   Age between 16-45 years 1 1   History of preadolescent sexual abuse 3 0   Psychological disease     ADD, OCD, bipolar, schizophrenia 2 2   Depression 1 1   Total: 3 or less = low, 8 or higher= high    Controlled Substance Agreement signed on the following date(s}: Workmans Comp:  Lawsuit:    Past Medical History:   Diagnosis Date    DJD (degenerative joint disease)     GERD (gastroesophageal reflux disease)     Insomnia     Osteoporosis     Type II or unspecified type diabetes mellitus without mention of complication, not stated as uncontrolled         Past Surgical History:   Procedure Laterality Date    KNEE ARTHROSCOPY Bilateral 2004    OTHER SURGICAL HISTORY Right 2018    right knee replacement    PARTIAL HYSTERECTOMY (CERVIX NOT REMOVED)  1973    SALPINGO-OOPHORECTOMY  1994    THYROIDECTOMY, PARTIAL          Family History   Problem Relation Age of Onset    Heart Disease Paternal Uncle 55    Heart Disease Father         got killed in WW-2 on D-day           Social History     Socioeconomic History    Marital status:      Spouse name: Not on file    Number of children: Not on file    Years of education: Not on file    Highest education level: Not on file   Occupational History    Not on file   Tobacco Use    Smoking status: Never    Smokeless tobacco: Never   Vaping Use    Vaping Use: Never

## 2024-07-09 ENCOUNTER — TELEPHONE (OUTPATIENT)
Dept: FAMILY MEDICINE CLINIC | Facility: CLINIC | Age: 79
End: 2024-07-09

## 2024-07-09 DIAGNOSIS — B37.31 VAGINA, CANDIDIASIS: Primary | ICD-10-CM

## 2024-07-09 RX ORDER — FLUCONAZOLE 150 MG/1
150 TABLET ORAL ONCE
Qty: 1 TABLET | Refills: 0 | Status: SHIPPED | OUTPATIENT
Start: 2024-07-09 | End: 2024-07-09

## 2024-07-09 NOTE — TELEPHONE ENCOUNTER
Pt called and the recent antibiotic has given her a yeast infection    will you send in something for the yeast infection    Jacki at [facility]

## 2024-07-16 ENCOUNTER — OFFICE VISIT (OUTPATIENT)
Dept: FAMILY MEDICINE CLINIC | Facility: CLINIC | Age: 79
End: 2024-07-16
Payer: MEDICARE

## 2024-07-16 VITALS
HEIGHT: 62 IN | RESPIRATION RATE: 16 BRPM | SYSTOLIC BLOOD PRESSURE: 110 MMHG | WEIGHT: 142.6 LBS | DIASTOLIC BLOOD PRESSURE: 70 MMHG | TEMPERATURE: 98.2 F | BODY MASS INDEX: 26.24 KG/M2 | HEART RATE: 89 BPM | OXYGEN SATURATION: 90 %

## 2024-07-16 DIAGNOSIS — E11.9 TYPE 2 DIABETES MELLITUS WITHOUT COMPLICATION, WITHOUT LONG-TERM CURRENT USE OF INSULIN (HCC): Primary | ICD-10-CM

## 2024-07-16 PROCEDURE — G8417 CALC BMI ABV UP PARAM F/U: HCPCS | Performed by: FAMILY MEDICINE

## 2024-07-16 PROCEDURE — 1090F PRES/ABSN URINE INCON ASSESS: CPT | Performed by: FAMILY MEDICINE

## 2024-07-16 PROCEDURE — 3078F DIAST BP <80 MM HG: CPT | Performed by: FAMILY MEDICINE

## 2024-07-16 PROCEDURE — 1123F ACP DISCUSS/DSCN MKR DOCD: CPT | Performed by: FAMILY MEDICINE

## 2024-07-16 PROCEDURE — 3074F SYST BP LT 130 MM HG: CPT | Performed by: FAMILY MEDICINE

## 2024-07-16 PROCEDURE — G8399 PT W/DXA RESULTS DOCUMENT: HCPCS | Performed by: FAMILY MEDICINE

## 2024-07-16 PROCEDURE — 1036F TOBACCO NON-USER: CPT | Performed by: FAMILY MEDICINE

## 2024-07-16 PROCEDURE — G8427 DOCREV CUR MEDS BY ELIG CLIN: HCPCS | Performed by: FAMILY MEDICINE

## 2024-07-16 PROCEDURE — 3051F HG A1C>EQUAL 7.0%<8.0%: CPT | Performed by: FAMILY MEDICINE

## 2024-07-16 PROCEDURE — 99214 OFFICE O/P EST MOD 30 MIN: CPT | Performed by: FAMILY MEDICINE

## 2024-07-16 ASSESSMENT — ENCOUNTER SYMPTOMS
NAUSEA: 0
BACK PAIN: 1
SHORTNESS OF BREATH: 0
WHEEZING: 0
ABDOMINAL PAIN: 0
PHOTOPHOBIA: 0
VOMITING: 0
DIARRHEA: 0
COUGH: 0
SORE THROAT: 0

## 2024-07-16 ASSESSMENT — PATIENT HEALTH QUESTIONNAIRE - PHQ9
SUM OF ALL RESPONSES TO PHQ QUESTIONS 1-9: 0
SUM OF ALL RESPONSES TO PHQ9 QUESTIONS 1 & 2: 0
SUM OF ALL RESPONSES TO PHQ QUESTIONS 1-9: 0
SUM OF ALL RESPONSES TO PHQ QUESTIONS 1-9: 0
2. FEELING DOWN, DEPRESSED OR HOPELESS: NOT AT ALL
SUM OF ALL RESPONSES TO PHQ QUESTIONS 1-9: 0
1. LITTLE INTEREST OR PLEASURE IN DOING THINGS: NOT AT ALL

## 2024-07-16 NOTE — PROGRESS NOTES
Khloe Hendricks (:  1945) is a 79 y.o. female,Established patient, here for evaluation of the following chief complaint(s):  Arm Pain and Diabetes      Assessment & Plan   1. Type 2 diabetes mellitus without complication, without long-term current use of insulin (HCC)  -     Semaglutide 14 MG TABS; Take 14 mg by mouth every morning (before breakfast), Disp-30 tablet, R-0Normal      Diabetes- better, continue current management, to check sugars off and on, to contact us if sugars are low, discussed about reducing her dose of Rybelsus down to 7 mg if she continues to lose a lot of weight, to recheck HbA1c at her f/u appointment.    Return for Has an appt on 24 or prn sooner.       Subjective   Hypertension  Pertinent negatives include no chest pain, neck pain, palpitations or shortness of breath.   Arm Pain   Pertinent negatives include no chest pain or numbness.   Diabetes  Associated symptoms include fatigue. Pertinent negatives for diabetes include no chest pain, no polydipsia, no polyphagia, no polyuria and no weakness.     Patient comes today for a 2 week follow up of-    Tingling, numbness in right upper extremity- we prescribed Medrol dose pack 2 weeks ago, says the tingling and numbness has fully resolved. She denies any associated symptoms, denies neck pain or pain in the upper extremities or weakness in her upper extremities. The radiologist recommended cervical x-rays to evaluate for increased uptake in the cervical spine, hence her orthopedic doctor, Dr Thomas, did a cervical X-ray on 23- he reviewed the cervical xrays with Dr. Cline who agreed it appeared degenerative, hence held off on further eval of C-spine.     Diabetes-  at last labs, HbA1c was 7.8 on 24, 8.1 on 3/1/24, 8.4 on 23, 7.4 in 2023; her sugar log at home shows her fasting sugars of 92- 136, has not checked 2 hrs PP; denies any e/o low sugars - has glucose tabs handy. We stopped Actos 15 mg as we

## 2024-07-27 DIAGNOSIS — G25.81 RESTLESS LEGS SYNDROME: ICD-10-CM

## 2024-07-27 DIAGNOSIS — K21.9 GASTROESOPHAGEAL REFLUX DISEASE WITHOUT ESOPHAGITIS: ICD-10-CM

## 2024-07-29 RX ORDER — OMEPRAZOLE 40 MG/1
40 CAPSULE, DELAYED RELEASE ORAL DAILY
Qty: 90 CAPSULE | Refills: 1 | OUTPATIENT
Start: 2024-07-29

## 2024-07-29 RX ORDER — ROPINIROLE 2 MG/1
2 TABLET, FILM COATED ORAL 2 TIMES DAILY
Qty: 180 TABLET | Refills: 1 | OUTPATIENT
Start: 2024-07-29

## 2024-08-05 RX ORDER — TIZANIDINE 2 MG/1
2 TABLET ORAL 2 TIMES DAILY
Qty: 60 TABLET | Refills: 2 | OUTPATIENT
Start: 2024-08-05

## 2024-08-08 ENCOUNTER — OFFICE VISIT (OUTPATIENT)
Dept: FAMILY MEDICINE CLINIC | Facility: CLINIC | Age: 79
End: 2024-08-08

## 2024-08-08 VITALS
OXYGEN SATURATION: 99 % | HEIGHT: 62 IN | SYSTOLIC BLOOD PRESSURE: 100 MMHG | TEMPERATURE: 97.6 F | WEIGHT: 146.6 LBS | RESPIRATION RATE: 16 BRPM | HEART RATE: 83 BPM | BODY MASS INDEX: 26.98 KG/M2 | DIASTOLIC BLOOD PRESSURE: 62 MMHG

## 2024-08-08 DIAGNOSIS — K21.9 GASTROESOPHAGEAL REFLUX DISEASE WITHOUT ESOPHAGITIS: Chronic | ICD-10-CM

## 2024-08-08 DIAGNOSIS — G25.81 RESTLESS LEGS SYNDROME: Chronic | ICD-10-CM

## 2024-08-08 DIAGNOSIS — E11.9 TYPE 2 DIABETES MELLITUS WITHOUT COMPLICATION, WITHOUT LONG-TERM CURRENT USE OF INSULIN (HCC): Primary | Chronic | ICD-10-CM

## 2024-08-08 DIAGNOSIS — F51.04 PSYCHOPHYSIOLOGICAL INSOMNIA: Chronic | ICD-10-CM

## 2024-08-08 DIAGNOSIS — F41.1 GENERALIZED ANXIETY DISORDER: Chronic | ICD-10-CM

## 2024-08-08 DIAGNOSIS — E66.3 OVERWEIGHT: ICD-10-CM

## 2024-08-08 RX ORDER — TRAZODONE HYDROCHLORIDE 100 MG/1
TABLET ORAL
Qty: 180 TABLET | Refills: 1 | Status: SHIPPED | OUTPATIENT
Start: 2024-08-08

## 2024-08-08 RX ORDER — ROPINIROLE 2 MG/1
2 TABLET, FILM COATED ORAL 2 TIMES DAILY
Qty: 180 TABLET | Refills: 1 | Status: SHIPPED | OUTPATIENT
Start: 2024-08-08

## 2024-08-08 RX ORDER — ATORVASTATIN CALCIUM 10 MG/1
10 TABLET, FILM COATED ORAL DAILY
Qty: 90 TABLET | Refills: 1 | Status: SHIPPED | OUTPATIENT
Start: 2024-08-08

## 2024-08-08 RX ORDER — OMEPRAZOLE 40 MG/1
40 CAPSULE, DELAYED RELEASE ORAL DAILY
Qty: 90 CAPSULE | Refills: 1 | Status: SHIPPED | OUTPATIENT
Start: 2024-08-08

## 2024-08-08 RX ORDER — GLIPIZIDE 10 MG/1
TABLET ORAL
Qty: 360 TABLET | Refills: 1 | Status: SHIPPED | OUTPATIENT
Start: 2024-08-08

## 2024-08-08 RX ORDER — LISINOPRIL 10 MG/1
10 TABLET ORAL DAILY
Qty: 90 TABLET | Refills: 1 | Status: SHIPPED | OUTPATIENT
Start: 2024-08-08

## 2024-08-08 RX ORDER — PREGABALIN 100 MG/1
100 CAPSULE ORAL 2 TIMES DAILY
Qty: 180 CAPSULE | Refills: 1 | Status: SHIPPED | OUTPATIENT
Start: 2024-08-08 | End: 2025-02-04

## 2024-08-08 RX ORDER — LISINOPRIL 20 MG/1
20 TABLET ORAL DAILY
Qty: 90 TABLET | Refills: 1 | Status: CANCELLED | OUTPATIENT
Start: 2024-08-08

## 2024-08-08 ASSESSMENT — PATIENT HEALTH QUESTIONNAIRE - PHQ9
SUM OF ALL RESPONSES TO PHQ QUESTIONS 1-9: 0
1. LITTLE INTEREST OR PLEASURE IN DOING THINGS: NOT AT ALL
2. FEELING DOWN, DEPRESSED OR HOPELESS: NOT AT ALL
SUM OF ALL RESPONSES TO PHQ QUESTIONS 1-9: 0
SUM OF ALL RESPONSES TO PHQ9 QUESTIONS 1 & 2: 0
SUM OF ALL RESPONSES TO PHQ QUESTIONS 1-9: 0
SUM OF ALL RESPONSES TO PHQ QUESTIONS 1-9: 0

## 2024-08-08 ASSESSMENT — ENCOUNTER SYMPTOMS
SHORTNESS OF BREATH: 0
BACK PAIN: 1
SINUS PAIN: 0
VOMITING: 0
COUGH: 0
PHOTOPHOBIA: 0
WHEEZING: 0
NAUSEA: 0
DIARRHEA: 0
ABDOMINAL PAIN: 0

## 2024-08-08 NOTE — PROGRESS NOTES
Khloe Hendricks (:  1945) is a 79 y.o. female,Established patient, here for evaluation of the following chief complaint(s):  Medication Refill      Assessment & Plan   ASSESSMENT/PLAN:  1. Type 2 diabetes mellitus without complication, without long-term current use of insulin (HCC)  -     atorvastatin (LIPITOR) 10 MG tablet; Take 1 tablet by mouth daily, Disp-90 tablet, R-1Normal  -     glipiZIDE (GLUCOTROL) 10 MG tablet; Take 2 tabs po twice daily with meals, Disp-360 tablet, R-1Normal  -     metFORMIN (GLUCOPHAGE) 1000 MG tablet; Take 1 tablet by mouth 2 times daily (with meals), Disp-180 tablet, R-1Normal  -     Semaglutide 14 MG TABS; Take 14 mg by mouth every morning (before breakfast), Disp-90 tablet, R-1Normal  -     CBC with Auto Differential; Future  -     Comprehensive Metabolic Panel; Future  -     Lipid Panel; Future  -     Microalbumin / Creatinine Urine Ratio; Future  -     Hemoglobin A1C; Future  -     lisinopril (PRINIVIL;ZESTRIL) 10 MG tablet; Take 1 tablet by mouth daily, Disp-90 tablet, R-1Normal  2. Gastroesophageal reflux disease without esophagitis  -     omeprazole (PRILOSEC) 40 MG delayed release capsule; Take 1 capsule by mouth daily, Disp-90 capsule, R-1Normal  3. Restless legs syndrome  -     pregabalin (LYRICA) 100 MG capsule; Take 1 capsule by mouth 2 times daily for 180 days. TAKE ONE CAPSULE BY MOUTH TWICE A DAY, THE MAXIMUM DAILY AMOUNT IS 2 TABLETS Max Daily Amount: 200 mg, Disp-180 capsule, R-1Normal  -     rOPINIRole (REQUIP) 2 MG tablet; Take 1 tablet by mouth 2 times daily, Disp-180 tablet, R-1Normal  4. Generalized anxiety disorder  -     traZODone (DESYREL) 100 MG tablet; Take 2 tabs po at HS, Disp-180 tablet, R-1Normal  5. Psychophysiological insomnia  -     traZODone (DESYREL) 100 MG tablet; Take 2 tabs po at HS, Disp-180 tablet, R-1Normal  6. Overweight  7. BMI 26.0-26.9,adult    Diabetes- controlled, continue current management, await labs, to call us if

## 2024-08-09 ENCOUNTER — HOSPITAL ENCOUNTER (OUTPATIENT)
Age: 79
Discharge: HOME | End: 2024-08-12

## 2024-08-09 DIAGNOSIS — E11.9 TYPE 2 DIABETES MELLITUS WITHOUT COMPLICATION, WITHOUT LONG-TERM CURRENT USE OF INSULIN (HCC): Chronic | ICD-10-CM

## 2024-08-09 LAB
ALBUMIN SERPL-MCNC: 3.8 G/DL (ref 3.2–4.6)
ALBUMIN/GLOB SERPL: 1.7 (ref 1–1.9)
ALP SERPL-CCNC: 58 U/L (ref 35–104)
ALT SERPL-CCNC: 12 U/L (ref 12–65)
ANION GAP SERPL CALC-SCNC: 10 MMOL/L (ref 9–18)
AST SERPL-CCNC: 16 U/L (ref 15–37)
BASOPHILS # BLD: 0 K/UL (ref 0–0.2)
BASOPHILS NFR BLD: 1 % (ref 0–2)
BILIRUB SERPL-MCNC: 0.3 MG/DL (ref 0–1.2)
BUN SERPL-MCNC: 11 MG/DL (ref 8–23)
CALCIUM SERPL-MCNC: 9.1 MG/DL (ref 8.8–10.2)
CHLORIDE SERPL-SCNC: 101 MMOL/L (ref 98–107)
CHOLEST SERPL-MCNC: 91 MG/DL (ref 0–200)
CO2 SERPL-SCNC: 27 MMOL/L (ref 20–28)
CREAT SERPL-MCNC: 0.52 MG/DL (ref 0.6–1.1)
DIFFERENTIAL METHOD BLD: ABNORMAL
EOSINOPHIL # BLD: 0 K/UL (ref 0–0.8)
EOSINOPHIL NFR BLD: 0 % (ref 0.5–7.8)
ERYTHROCYTE [DISTWIDTH] IN BLOOD BY AUTOMATED COUNT: 13.7 % (ref 11.9–14.6)
EST. AVERAGE GLUCOSE BLD GHB EST-MCNC: 169 MG/DL
GLOBULIN SER CALC-MCNC: 2.3 G/DL (ref 2.3–3.5)
GLUCOSE SERPL-MCNC: 123 MG/DL (ref 70–99)
HBA1C MFR BLD: 7.5 % (ref 0–5.6)
HCT VFR BLD AUTO: 31.9 % (ref 35.8–46.3)
HDLC SERPL-MCNC: 47 MG/DL (ref 40–60)
HDLC SERPL: 1.9 (ref 0–5)
HGB BLD-MCNC: 10.2 G/DL (ref 11.7–15.4)
IMM GRANULOCYTES # BLD AUTO: 0 K/UL (ref 0–0.5)
IMM GRANULOCYTES NFR BLD AUTO: 0 % (ref 0–5)
LDLC SERPL CALC-MCNC: 27 MG/DL (ref 0–100)
LYMPHOCYTES # BLD: 1.3 K/UL (ref 0.5–4.6)
LYMPHOCYTES NFR BLD: 29 % (ref 13–44)
MCH RBC QN AUTO: 32.9 PG (ref 26.1–32.9)
MCHC RBC AUTO-ENTMCNC: 32 G/DL (ref 31.4–35)
MCV RBC AUTO: 102.9 FL (ref 82–102)
MONOCYTES # BLD: 0.6 K/UL (ref 0.1–1.3)
MONOCYTES NFR BLD: 13 % (ref 4–12)
NEUTS SEG # BLD: 2.5 K/UL (ref 1.7–8.2)
NEUTS SEG NFR BLD: 57 % (ref 43–78)
NRBC # BLD: 0.02 K/UL (ref 0–0.2)
PLATELET # BLD AUTO: 247 K/UL (ref 150–450)
PMV BLD AUTO: 10.6 FL (ref 9.4–12.3)
POTASSIUM SERPL-SCNC: 4.6 MMOL/L (ref 3.5–5.1)
PROT SERPL-MCNC: 6.1 G/DL (ref 6.3–8.2)
RBC # BLD AUTO: 3.1 M/UL (ref 4.05–5.2)
SODIUM SERPL-SCNC: 138 MMOL/L (ref 136–145)
TRIGL SERPL-MCNC: 85 MG/DL (ref 0–150)
VLDLC SERPL CALC-MCNC: 17 MG/DL (ref 6–23)
WBC # BLD AUTO: 4.5 K/UL (ref 4.3–11.1)

## 2024-08-29 NOTE — PROGRESS NOTES
Procedure Date: 2024      Location: [facility]       Procedure: Right GTB and IT band       Time Out performed prior to start of the procedure:       Leroy Márquez M.D.  performed the following reviews on Khloe Hendricks 1945 prior to the start of the procedure:       patient was identified by name and     agreement on procedure being performed was verified   risks and benefits explained to patient by the provider  procedure site verified as Right  patient was positioned for comfort   consent signed and verified for procedure       Time:  3:46 PM        Procedure performed by:   Leroy Márquez M.D.       Patient assisted by:   Lavern Interiano MA

## 2024-09-03 ENCOUNTER — OFFICE VISIT (OUTPATIENT)
Dept: ORTHOPEDIC SURGERY | Age: 79
End: 2024-09-03
Payer: MEDICARE

## 2024-09-03 DIAGNOSIS — M70.61 GREATER TROCHANTERIC BURSITIS OF RIGHT HIP: Primary | ICD-10-CM

## 2024-09-03 DIAGNOSIS — M76.31 ILIOTIBIAL BAND SYNDROME OF RIGHT SIDE: ICD-10-CM

## 2024-09-03 PROCEDURE — 20550 NJX 1 TENDON SHEATH/LIGAMENT: CPT | Performed by: ANESTHESIOLOGY

## 2024-09-03 PROCEDURE — 76942 ECHO GUIDE FOR BIOPSY: CPT | Performed by: ANESTHESIOLOGY

## 2024-09-03 PROCEDURE — 20610 DRAIN/INJ JOINT/BURSA W/O US: CPT | Performed by: ANESTHESIOLOGY

## 2024-09-03 RX ORDER — METHYLPREDNISOLONE ACETATE 40 MG/ML
40 INJECTION, SUSPENSION INTRA-ARTICULAR; INTRALESIONAL; INTRAMUSCULAR; SOFT TISSUE ONCE
Status: COMPLETED | OUTPATIENT
Start: 2024-09-03 | End: 2024-09-03

## 2024-09-03 RX ADMIN — METHYLPREDNISOLONE ACETATE 40 MG: 40 INJECTION, SUSPENSION INTRA-ARTICULAR; INTRALESIONAL; INTRAMUSCULAR; SOFT TISSUE at 10:08

## 2024-09-03 NOTE — PROGRESS NOTES
DAGTBINJ   NAME: Khloe Hendricks   ID:[id number]   :1945    Location: [facility]     Preprocedure Diagnosis: Greater Trochanteric Bursitis     Postprocedure Diagnosis: Greater Trochanteric Bursitis     Procedure: Greater Trochanteric Bursa Injection    Anesthesia: None    Complications: None    After confirming written and informed consent and discussing the risk, benefits and alternatives for the procedure, the patient had the correct site marked by the physician performing the procedure. The specific risks of bleeding and infection were discussed.    The patient was placed in the supine position. The physician cleaned his hands with an alcohol containing solution, and wore a hat and mask. Sterile gloves were applied. The skin overlying the lateral right hip was cleaned with CHG. Sterile towels were applied as a drape. A timeout was then performed involving the patient, physician    The greater trochanter was then identified using palpation. The skin overlying the anticipated entry site and the expected needle path was anesthetized with 3 ml of 1% lidocaine using a 25 G 1.5 inch needle. Next, a 22 G  1.5 inch needle was advanced superior to the greater trochanter. The needle tip was advanced to the superior aspect of the greater trochanter until bone was contacted. Aspiration was negative for blood or joint fluid. Depo-Medrol 40 mg was injected.    The needle was then removed. A Band-Aid was applied. The patient was monitored in the recovery room for an adequate amount of time. Upon meeting discharge criteria, the patient was discharged from the clinic.    DAITBAND    NAME: Khloe Hendricks   ID:[id number]  :1945   DOS:9/3/2024    Location: [facility]    Preoperative Diagnosis: IT Band Syndrome   Postoperative Diagnosis: Same    Anesthesia: Local Only     Complications: None    Procedure: Ultrasound Guided Iliotibial band bursitis injection    After confirming written

## 2025-01-10 ENCOUNTER — TELEPHONE (OUTPATIENT)
Dept: FAMILY MEDICINE CLINIC | Facility: CLINIC | Age: 80
End: 2025-01-10

## 2025-01-10 ENCOUNTER — TELEMEDICINE (OUTPATIENT)
Dept: FAMILY MEDICINE CLINIC | Facility: CLINIC | Age: 80
End: 2025-01-10

## 2025-01-10 DIAGNOSIS — B37.9 YEAST INFECTION: Primary | ICD-10-CM

## 2025-01-10 RX ORDER — FLUCONAZOLE 150 MG/1
150 TABLET ORAL ONCE
Qty: 1 TABLET | Refills: 0 | Status: SHIPPED | OUTPATIENT
Start: 2025-01-10 | End: 2025-01-10

## 2025-01-10 NOTE — PROGRESS NOTES
Khloe Hendricks, was evaluated through a synchronous (real-time) audio-video encounter. The patient (or guardian if applicable) is aware that this is a billable service, which includes applicable co-pays. This Virtual Visit was conducted with patient's (and/or legal guardian's) consent. Patient identification was verified, and a caregiver was present when appropriate.   The patient was located at Home: [address] [place] [address] [address] [place] [address]  Provider was located at Facility (Appt Dept): [address] [place] [address],  [place] [address]  Confirm you are appropriately licensed, registered, or certified to deliver care in the state where the patient is located as indicated above. If you are not or unsure, please re-schedule the visit: Yes, I confirm.     Khloe Hendricks (:  1945) is a Established patient, presenting virtually for evaluation of the following:      Below is the assessment and plan developed based on review of pertinent history, physical exam, labs, studies, and medications.     Assessment & Plan  Yeast infection   Acute condition, new,      Orders:    fluconazole (DIFLUCAN) 150 MG tablet; Take 1 tablet by mouth once for 1 dose      No follow-ups on file.     (Patient had technical issues connecting the video component. Discussed via phone   Subjective   HPI  Khloe Hendricks (:  1945) is a Established patient, presenting virtually for evaluation of possible yeast  infection.     She is a patient of doctor Thai. Reports she likely has a yeast infection and has had similar issue in the past. No fevers, dysuria or other symptoms reported. She reports whitish discharge and itching. Doesn't feel good d/t this problem.     Review of Systems   All other pertinent systems reviewed and negative except as noted in the HPI.    Objective   Patient-Reported Vitals  No data recorded     Physical Exam       On this date 1/10/2025 I have spent   7 minutes reviewing previous notes, test

## 2025-01-10 NOTE — TELEPHONE ENCOUNTER
Delroy Viveros,    Mrs Hendricks is dealing with a yeast infection. She said she does not want it to get worse over the weekend.    We informed her that we cannot send a medication without a visit. She said Dr HERNANDEZ usually send her something in, but she knows that is not our protocol.    She lives far away and her  is handicap.     She is asking if you can send something?

## 2025-01-13 ENCOUNTER — TELEMEDICINE (OUTPATIENT)
Dept: FAMILY MEDICINE CLINIC | Facility: CLINIC | Age: 80
End: 2025-01-13

## 2025-01-13 DIAGNOSIS — F51.04 PSYCHOPHYSIOLOGICAL INSOMNIA: Chronic | ICD-10-CM

## 2025-01-13 DIAGNOSIS — F41.1 GENERALIZED ANXIETY DISORDER: Chronic | ICD-10-CM

## 2025-01-13 DIAGNOSIS — E11.9 TYPE 2 DIABETES MELLITUS WITHOUT COMPLICATION, WITHOUT LONG-TERM CURRENT USE OF INSULIN (HCC): Chronic | ICD-10-CM

## 2025-01-13 RX ORDER — TRAZODONE HYDROCHLORIDE 100 MG/1
TABLET ORAL
Qty: 60 TABLET | Refills: 0 | Status: SHIPPED | OUTPATIENT
Start: 2025-01-13

## 2025-01-13 RX ORDER — PIOGLITAZONE 30 MG/1
30 TABLET ORAL DAILY
Qty: 30 TABLET | Refills: 0 | Status: SHIPPED | OUTPATIENT
Start: 2025-01-13

## 2025-01-13 RX ORDER — FLUCONAZOLE 150 MG/1
TABLET ORAL
COMMUNITY
Start: 2025-01-10

## 2025-01-13 ASSESSMENT — ENCOUNTER SYMPTOMS
PHOTOPHOBIA: 0
VOMITING: 0
ABDOMINAL PAIN: 0
COUGH: 0
WHEEZING: 0
NAUSEA: 0
DIARRHEA: 0
SHORTNESS OF BREATH: 0
SINUS PAIN: 0

## 2025-01-13 ASSESSMENT — PATIENT HEALTH QUESTIONNAIRE - PHQ9
2. FEELING DOWN, DEPRESSED OR HOPELESS: NOT AT ALL
SUM OF ALL RESPONSES TO PHQ QUESTIONS 1-9: 0
SUM OF ALL RESPONSES TO PHQ QUESTIONS 1-9: 0
SUM OF ALL RESPONSES TO PHQ9 QUESTIONS 1 & 2: 0
SUM OF ALL RESPONSES TO PHQ QUESTIONS 1-9: 0
1. LITTLE INTEREST OR PLEASURE IN DOING THINGS: NOT AT ALL
SUM OF ALL RESPONSES TO PHQ QUESTIONS 1-9: 0

## 2025-01-13 NOTE — PROGRESS NOTES
tabs at HS- requests a refill, takes Benadryl 1 tab at HS, stopped Melatonin 10 mg 1 capsule at HS. She is getting 5- 6 hours of sleep- wakes up around 1.30 am from right hip pain- has seen the Ortho without much help, uses Tylenol arthritis which does not help. (Tried Doxepin - did not help her sleep at night, felt tired and drowsy the next day, had hallucinations when she took Ambien.) She takes Ativan 1 mg rarely now. She denies any depression, anxiety, SI/ HI. Says she went to counseling many years ago- did not work, refuses counseling.     Review of Systems   Constitutional:  Negative for chills and fever.   HENT:  Negative for congestion and sinus pain.    Eyes:  Negative for photophobia and visual disturbance.   Respiratory:  Negative for cough, shortness of breath and wheezing.    Cardiovascular:  Negative for chest pain, palpitations and leg swelling.   Gastrointestinal:  Negative for abdominal pain, diarrhea, nausea and vomiting.   Endocrine: Negative for polydipsia, polyphagia and polyuria.   Musculoskeletal:  Positive for arthralgias and myalgias.   Neurological:  Negative for weakness and numbness.   Psychiatric/Behavioral:  Positive for sleep disturbance. Negative for agitation, dysphoric mood, hallucinations, self-injury and suicidal ideas. The patient is nervous/anxious.           Objective   Patient-Reported Vitals  No data recorded     Physical Exam       Physical exam was not possible as this was a virtual telephone visit.    On this date 1/13/2025 I have spent 22 minutes reviewing previous notes, test results and face to face (virtual) with the patient discussing the diagnosis and importance of compliance with the treatment plan as well as documenting on the day of the visit.    --Nydia Andre MD

## 2025-01-13 NOTE — ASSESSMENT & PLAN NOTE
Chronic, not at goal (unstable), continue current treatment plan    Orders:    traZODone (DESYREL) 100 MG tablet; Take 2 tabs po at HS

## 2025-01-13 NOTE — ASSESSMENT & PLAN NOTE
Chronic, not at goal (unstable), DC Semaglutide 14 mg due to cost; restart Actos 30 mg- has taken before without side effects- advised to check sugars and keep a log- to contact us if they are high or low. She has an appointment on 2/11/15.    Orders:    pioglitazone (ACTOS) 30 MG tablet; Take 1 tablet by mouth daily

## 2025-02-09 DIAGNOSIS — F41.1 GENERALIZED ANXIETY DISORDER: Chronic | ICD-10-CM

## 2025-02-09 DIAGNOSIS — F51.04 PSYCHOPHYSIOLOGICAL INSOMNIA: Chronic | ICD-10-CM

## 2025-02-09 DIAGNOSIS — E11.9 TYPE 2 DIABETES MELLITUS WITHOUT COMPLICATION, WITHOUT LONG-TERM CURRENT USE OF INSULIN (HCC): Chronic | ICD-10-CM

## 2025-02-10 RX ORDER — TRAZODONE HYDROCHLORIDE 100 MG/1
TABLET ORAL
Qty: 5 TABLET | Refills: 0 | Status: SHIPPED | OUTPATIENT
Start: 2025-02-10 | End: 2025-02-11 | Stop reason: SDUPTHER

## 2025-02-10 RX ORDER — PIOGLITAZONE 30 MG/1
30 TABLET ORAL DAILY
Qty: 5 TABLET | Refills: 0 | Status: SHIPPED | OUTPATIENT
Start: 2025-02-10 | End: 2025-02-11 | Stop reason: DRUGHIGH

## 2025-02-11 ENCOUNTER — OFFICE VISIT (OUTPATIENT)
Dept: FAMILY MEDICINE CLINIC | Facility: CLINIC | Age: 80
End: 2025-02-11
Payer: MEDICARE

## 2025-02-11 VITALS
RESPIRATION RATE: 16 BRPM | HEART RATE: 68 BPM | TEMPERATURE: 97.7 F | OXYGEN SATURATION: 99 % | HEIGHT: 62 IN | WEIGHT: 145 LBS | DIASTOLIC BLOOD PRESSURE: 62 MMHG | SYSTOLIC BLOOD PRESSURE: 112 MMHG | BODY MASS INDEX: 26.68 KG/M2

## 2025-02-11 DIAGNOSIS — K21.9 GASTROESOPHAGEAL REFLUX DISEASE WITHOUT ESOPHAGITIS: Chronic | ICD-10-CM

## 2025-02-11 DIAGNOSIS — E11.9 TYPE 2 DIABETES MELLITUS WITHOUT COMPLICATION, WITHOUT LONG-TERM CURRENT USE OF INSULIN (HCC): Chronic | ICD-10-CM

## 2025-02-11 DIAGNOSIS — F41.1 GENERALIZED ANXIETY DISORDER: Chronic | ICD-10-CM

## 2025-02-11 DIAGNOSIS — G25.81 RESTLESS LEGS SYNDROME: Chronic | ICD-10-CM

## 2025-02-11 DIAGNOSIS — E11.9 TYPE 2 DIABETES MELLITUS WITHOUT COMPLICATION, WITHOUT LONG-TERM CURRENT USE OF INSULIN (HCC): Primary | Chronic | ICD-10-CM

## 2025-02-11 DIAGNOSIS — F51.04 PSYCHOPHYSIOLOGICAL INSOMNIA: Chronic | ICD-10-CM

## 2025-02-11 DIAGNOSIS — E66.3 OVERWEIGHT: Chronic | ICD-10-CM

## 2025-02-11 LAB
ALBUMIN SERPL-MCNC: 3.9 G/DL (ref 3.2–4.6)
ALBUMIN/GLOB SERPL: 1.4 (ref 1–1.9)
ALP SERPL-CCNC: 75 U/L (ref 35–104)
ALT SERPL-CCNC: 16 U/L (ref 8–45)
ANION GAP SERPL CALC-SCNC: 9 MMOL/L (ref 7–16)
AST SERPL-CCNC: 19 U/L (ref 15–37)
BASOPHILS # BLD: 0.04 K/UL (ref 0–0.2)
BASOPHILS NFR BLD: 0.9 % (ref 0–2)
BILIRUB SERPL-MCNC: 0.3 MG/DL (ref 0–1.2)
BUN SERPL-MCNC: 10 MG/DL (ref 8–23)
CALCIUM SERPL-MCNC: 9.4 MG/DL (ref 8.8–10.2)
CHLORIDE SERPL-SCNC: 101 MMOL/L (ref 98–107)
CHOLEST SERPL-MCNC: 108 MG/DL (ref 0–200)
CO2 SERPL-SCNC: 26 MMOL/L (ref 20–29)
CREAT SERPL-MCNC: 0.54 MG/DL (ref 0.6–1.1)
DIFFERENTIAL METHOD BLD: ABNORMAL
EOSINOPHIL # BLD: 0.04 K/UL (ref 0–0.8)
EOSINOPHIL NFR BLD: 0.9 % (ref 0.5–7.8)
ERYTHROCYTE [DISTWIDTH] IN BLOOD BY AUTOMATED COUNT: 13.2 % (ref 11.9–14.6)
EST. AVERAGE GLUCOSE BLD GHB EST-MCNC: 234 MG/DL
GLOBULIN SER CALC-MCNC: 2.7 G/DL (ref 2.3–3.5)
GLUCOSE SERPL-MCNC: 244 MG/DL (ref 70–99)
HBA1C MFR BLD: 9.8 % (ref 0–5.6)
HCT VFR BLD AUTO: 33.3 % (ref 35.8–46.3)
HDLC SERPL-MCNC: 59 MG/DL (ref 40–60)
HDLC SERPL: 1.8 (ref 0–5)
HGB BLD-MCNC: 10.8 G/DL (ref 11.7–15.4)
IMM GRANULOCYTES # BLD AUTO: 0.01 K/UL (ref 0–0.5)
IMM GRANULOCYTES NFR BLD AUTO: 0.2 % (ref 0–5)
LDLC SERPL CALC-MCNC: 37 MG/DL (ref 0–100)
LYMPHOCYTES # BLD: 1.47 K/UL (ref 0.5–4.6)
LYMPHOCYTES NFR BLD: 32.7 % (ref 13–44)
MCH RBC QN AUTO: 32.7 PG (ref 26.1–32.9)
MCHC RBC AUTO-ENTMCNC: 32.4 G/DL (ref 31.4–35)
MCV RBC AUTO: 100.9 FL (ref 82–102)
MONOCYTES # BLD: 0.49 K/UL (ref 0.1–1.3)
MONOCYTES NFR BLD: 10.9 % (ref 4–12)
NEUTS SEG # BLD: 2.45 K/UL (ref 1.7–8.2)
NEUTS SEG NFR BLD: 54.4 % (ref 43–78)
NRBC # BLD: 0 K/UL (ref 0–0.2)
PLATELET # BLD AUTO: 245 K/UL (ref 150–450)
PMV BLD AUTO: 11 FL (ref 9.4–12.3)
POTASSIUM SERPL-SCNC: 4.2 MMOL/L (ref 3.5–5.1)
PROT SERPL-MCNC: 6.7 G/DL (ref 6.3–8.2)
RBC # BLD AUTO: 3.3 M/UL (ref 4.05–5.2)
SODIUM SERPL-SCNC: 136 MMOL/L (ref 136–145)
TRIGL SERPL-MCNC: 59 MG/DL (ref 0–150)
VLDLC SERPL CALC-MCNC: 12 MG/DL (ref 6–23)
WBC # BLD AUTO: 4.5 K/UL (ref 4.3–11.1)

## 2025-02-11 PROCEDURE — 1159F MED LIST DOCD IN RCRD: CPT | Performed by: FAMILY MEDICINE

## 2025-02-11 PROCEDURE — G2211 COMPLEX E/M VISIT ADD ON: HCPCS | Performed by: FAMILY MEDICINE

## 2025-02-11 PROCEDURE — 3078F DIAST BP <80 MM HG: CPT | Performed by: FAMILY MEDICINE

## 2025-02-11 PROCEDURE — 99214 OFFICE O/P EST MOD 30 MIN: CPT | Performed by: FAMILY MEDICINE

## 2025-02-11 PROCEDURE — 3074F SYST BP LT 130 MM HG: CPT | Performed by: FAMILY MEDICINE

## 2025-02-11 PROCEDURE — G8417 CALC BMI ABV UP PARAM F/U: HCPCS | Performed by: FAMILY MEDICINE

## 2025-02-11 PROCEDURE — 1123F ACP DISCUSS/DSCN MKR DOCD: CPT | Performed by: FAMILY MEDICINE

## 2025-02-11 PROCEDURE — G8427 DOCREV CUR MEDS BY ELIG CLIN: HCPCS | Performed by: FAMILY MEDICINE

## 2025-02-11 PROCEDURE — 1090F PRES/ABSN URINE INCON ASSESS: CPT | Performed by: FAMILY MEDICINE

## 2025-02-11 PROCEDURE — 1036F TOBACCO NON-USER: CPT | Performed by: FAMILY MEDICINE

## 2025-02-11 PROCEDURE — G8399 PT W/DXA RESULTS DOCUMENT: HCPCS | Performed by: FAMILY MEDICINE

## 2025-02-11 PROCEDURE — 1160F RVW MEDS BY RX/DR IN RCRD: CPT | Performed by: FAMILY MEDICINE

## 2025-02-11 RX ORDER — OMEPRAZOLE 40 MG/1
40 CAPSULE, DELAYED RELEASE ORAL DAILY
Qty: 90 CAPSULE | Refills: 1 | Status: SHIPPED | OUTPATIENT
Start: 2025-02-11

## 2025-02-11 RX ORDER — PIOGLITAZONE 30 MG/1
30 TABLET ORAL DAILY
Qty: 90 TABLET | Refills: 1 | Status: CANCELLED | OUTPATIENT
Start: 2025-02-11

## 2025-02-11 RX ORDER — TRAZODONE HYDROCHLORIDE 100 MG/1
TABLET ORAL
Qty: 5 TABLET | Refills: 0 | Status: SHIPPED | OUTPATIENT
Start: 2025-02-11

## 2025-02-11 RX ORDER — PREGABALIN 100 MG/1
100 CAPSULE ORAL 2 TIMES DAILY
Qty: 180 CAPSULE | Refills: 1 | Status: SHIPPED | OUTPATIENT
Start: 2025-02-11 | End: 2025-08-10

## 2025-02-11 RX ORDER — GLIPIZIDE 10 MG/1
TABLET ORAL
Qty: 360 TABLET | Refills: 1 | Status: SHIPPED | OUTPATIENT
Start: 2025-02-11

## 2025-02-11 RX ORDER — LISINOPRIL 10 MG/1
10 TABLET ORAL DAILY
Qty: 90 TABLET | Refills: 1 | Status: SHIPPED | OUTPATIENT
Start: 2025-02-11

## 2025-02-11 RX ORDER — ROPINIROLE 2 MG/1
2 TABLET, FILM COATED ORAL 2 TIMES DAILY
Qty: 180 TABLET | Refills: 1 | Status: SHIPPED | OUTPATIENT
Start: 2025-02-11

## 2025-02-11 RX ORDER — ATORVASTATIN CALCIUM 10 MG/1
10 TABLET, FILM COATED ORAL DAILY
Qty: 90 TABLET | Refills: 1 | Status: SHIPPED | OUTPATIENT
Start: 2025-02-11

## 2025-02-11 RX ORDER — PIOGLITAZONE 45 MG/1
45 TABLET ORAL NIGHTLY
Qty: 30 TABLET | Refills: 0 | Status: SHIPPED | OUTPATIENT
Start: 2025-02-11 | End: 2025-03-13

## 2025-02-11 SDOH — ECONOMIC STABILITY: FOOD INSECURITY: WITHIN THE PAST 12 MONTHS, THE FOOD YOU BOUGHT JUST DIDN'T LAST AND YOU DIDN'T HAVE MONEY TO GET MORE.: NEVER TRUE

## 2025-02-11 SDOH — ECONOMIC STABILITY: FOOD INSECURITY: WITHIN THE PAST 12 MONTHS, YOU WORRIED THAT YOUR FOOD WOULD RUN OUT BEFORE YOU GOT MONEY TO BUY MORE.: NEVER TRUE

## 2025-02-11 ASSESSMENT — ENCOUNTER SYMPTOMS
ABDOMINAL PAIN: 0
PHOTOPHOBIA: 0
DIARRHEA: 0
VOMITING: 0
BACK PAIN: 1
NAUSEA: 0
SINUS PAIN: 0
WHEEZING: 0
COUGH: 0
SHORTNESS OF BREATH: 0

## 2025-02-11 ASSESSMENT — PATIENT HEALTH QUESTIONNAIRE - PHQ9
SUM OF ALL RESPONSES TO PHQ QUESTIONS 1-9: 0
SUM OF ALL RESPONSES TO PHQ QUESTIONS 1-9: 0
SUM OF ALL RESPONSES TO PHQ9 QUESTIONS 1 & 2: 0
1. LITTLE INTEREST OR PLEASURE IN DOING THINGS: NOT AT ALL
SUM OF ALL RESPONSES TO PHQ QUESTIONS 1-9: 0
2. FEELING DOWN, DEPRESSED OR HOPELESS: NOT AT ALL
SUM OF ALL RESPONSES TO PHQ QUESTIONS 1-9: 0

## 2025-02-11 NOTE — PROGRESS NOTES
uses a cane. She is taking Diclofenac 75 mg once a day as it does not help, took Flexeril which helped a little, was then prescribed Baclofen at HS, stopped topical used ice packs and heat; uses topical Voltaren gel.)     (Vit B12 deficiency- she reports a h/o of this 20 years ago, used to take Vit B12 injections then, currently does not take a MVI and is taking an otc Vit B12 every other day- labs have been good; denies fatigue typically.)    Review of Systems   Constitutional:  Positive for fatigue. Negative for chills and fever.   HENT:  Negative for congestion and sinus pain.    Eyes:  Negative for photophobia and visual disturbance.   Respiratory:  Negative for cough, shortness of breath and wheezing.    Cardiovascular:  Negative for chest pain, palpitations and leg swelling.   Gastrointestinal:  Negative for abdominal pain, diarrhea, nausea and vomiting.   Endocrine: Negative for polydipsia, polyphagia and polyuria.   Genitourinary:  Negative for difficulty urinating, enuresis, flank pain, frequency, hematuria, pelvic pain and urgency.   Musculoskeletal:  Positive for arthralgias, back pain, gait problem and myalgias.   Neurological:  Negative for dizziness, weakness, light-headedness, numbness and headaches.   Psychiatric/Behavioral:  Positive for sleep disturbance. Negative for agitation, dysphoric mood, self-injury and suicidal ideas. The patient is nervous/anxious. The patient is not hyperactive.           Objective   Physical Exam  Vitals and nursing note reviewed.   Constitutional:       General: She is not in acute distress.     Comments: Generalized debility and weakness   HENT:      Mouth/Throat:      Mouth: Mucous membranes are moist.      Pharynx: Oropharynx is clear.   Eyes:      Conjunctiva/sclera: Conjunctivae normal.      Pupils: Pupils are equal, round, and reactive to light.   Cardiovascular:      Rate and Rhythm: Normal rate and regular rhythm.   Pulmonary:      Effort: Pulmonary effort is

## 2025-03-04 ENCOUNTER — TELEMEDICINE (OUTPATIENT)
Dept: FAMILY MEDICINE CLINIC | Facility: CLINIC | Age: 80
End: 2025-03-04

## 2025-03-04 DIAGNOSIS — E11.9 TYPE 2 DIABETES MELLITUS WITHOUT COMPLICATION, WITHOUT LONG-TERM CURRENT USE OF INSULIN (HCC): Chronic | ICD-10-CM

## 2025-03-04 DIAGNOSIS — Z00.00 MEDICARE ANNUAL WELLNESS VISIT, SUBSEQUENT: Primary | ICD-10-CM

## 2025-03-04 RX ORDER — PIOGLITAZONE 45 MG/1
45 TABLET ORAL NIGHTLY
Qty: 90 TABLET | Refills: 1 | Status: SHIPPED | OUTPATIENT
Start: 2025-03-04

## 2025-03-04 ASSESSMENT — PATIENT HEALTH QUESTIONNAIRE - PHQ9
1. LITTLE INTEREST OR PLEASURE IN DOING THINGS: NOT AT ALL
2. FEELING DOWN, DEPRESSED OR HOPELESS: NOT AT ALL
SUM OF ALL RESPONSES TO PHQ QUESTIONS 1-9: 0

## 2025-03-04 ASSESSMENT — ENCOUNTER SYMPTOMS
NAUSEA: 0
DIARRHEA: 0
SINUS PAIN: 0
SHORTNESS OF BREATH: 0
ABDOMINAL PAIN: 0
PHOTOPHOBIA: 0
COUGH: 0
WHEEZING: 0
VOMITING: 0
BACK PAIN: 1

## 2025-03-04 ASSESSMENT — LIFESTYLE VARIABLES
HOW OFTEN DO YOU HAVE A DRINK CONTAINING ALCOHOL: MONTHLY OR LESS
HOW MANY STANDARD DRINKS CONTAINING ALCOHOL DO YOU HAVE ON A TYPICAL DAY: 1 OR 2

## 2025-03-04 NOTE — PROGRESS NOTES
(REQUIP) 2 MG tablet Take 1 tablet by mouth 2 times daily Yes Nydia Andre MD   traZODone (DESYREL) 100 MG tablet TAKE 2 TABLETS BY MOUTH AT BEDTIME Yes Nydia Andre MD   fluconazole (DIFLUCAN) 150 MG tablet  Yes ProviderArlene MD   glucose monitoring kit Dx- Diabetes E11.65 Yes Nydia Andre MD   Lancets MISC To check sugars once daily. Dx- Diabetes E11.65 Yes Nydia Andre MD   blood glucose monitor strips Check once daily as directed. Dx- Diabetes E11.65 Yes Nydia Andre MD   diclofenac sodium (VOLTAREN) 1 % GEL Apply 4 g topically 4 times daily as needed for Pain Yes Seth Thomas PA   tiZANidine (ZANAFLEX) 2 MG tablet Take 1 tablet by mouth 2 times daily Yes Seth Thomas PA   cyanocobalamin 1000 MCG tablet Take 1 tablet by mouth every other day Yes ProviderArlene MD   aspirin 81 MG chewable tablet Take 1 tablet by mouth daily Yes Automatic Reconciliation, Ar       CareTeam (Including outside providers/suppliers regularly involved in providing care):   Patient Care Team:  Nydia Andre MD as PCP - General  Nydia Andre MD as PCP - Empaneled Provider     Recommendations for Preventive Services Due: see orders and patient instructions/AVS.  Recommended screening schedule for the next 5-10 years is provided to the patient in written form: see Patient Instructions/AVS.     Reviewed and updated this visit:  Tobacco  Allergies  Meds  Problems  Med Hx  Surg Hx  Fam Hx  Sexual   Hx               Khloe Hendricks, was evaluated through a synchronous (real-time) audio-only encounter. The patient (or guardian if applicable) is aware that this is a billable service, which includes applicable co-pays. This Virtual Visit was conducted with patient's (and/or legal guardian's) consent. Patient identification was verified, and a caregiver was present when appropriate.   The patient was located at Home: [address] [place] [address] [place] [address]  Provider was located at

## 2025-03-04 NOTE — PATIENT INSTRUCTIONS
instruction, always ask your healthcare professional. [employer], disclaims any warranty or liability for your use of this information.         Learning About Dental Care for Older Adults  Dental care for older adults: Overview  Dental care for older people is much the same as for younger adults. But older adults do have concerns that younger adults do not. Older adults may have problems with gum disease and decay on the roots of their teeth. They may need missing teeth replaced or broken fillings fixed. Or they may have dentures that need to be cared for. Some older adults may have trouble holding a toothbrush.  You can help remind the person you are caring for to brush and floss their teeth or to clean their dentures. In some cases, you may need to do the brushing and other dental care tasks. People who have trouble using their hands or who have dementia may need this extra help.  How can you help with dental care?  Normal dental care  To keep the teeth and gums healthy:  Brush the teeth with fluoride toothpaste twice a day--in the morning and at night--and floss at least once a day. Plaque can quickly build up on the teeth of older adults.  Watch for the signs of gum disease. These signs include gums that bleed after brushing or after eating hard foods, such as apples.  See a dentist regularly. Many experts recommend checkups every 6 months.  Keep the dentist up to date on any new medications the person is taking.  Encourage a balanced diet that includes whole grains, vegetables, and fruits, and that is low in saturated fat and sodium.  Encourage the person you're caring for not to use tobacco products. They can affect dental and general health.  Many older adults have a fixed income and feel that they can't afford dental care. But most [facility] and [facility] have programs in which dentists help older adults by lowering fees. Contact your area's public health offices or  for information about

## 2025-03-04 NOTE — ACP (ADVANCE CARE PLANNING)
Date of ACP Conversation: 3/4/25  Persons included in Conversation: Patient  Length of ACP Conversation in minutes: 5 minutes    Authorized Decision Maker (if patient is incapable of making informed decisions): NA          For Patients with Decision Making Capacity:   Patient does have a Living Will, Advance Directives and Healthcare Power of .      Conversation Outcomes / Follow-Up Plan:   Patient does have a living will, Healthcare POA and Advance directive. Advised patient to bring a copy of the AD and HCPOA to their chart - patient understands and agrees.

## 2025-03-11 ENCOUNTER — TELEPHONE (OUTPATIENT)
Dept: FAMILY MEDICINE CLINIC | Facility: CLINIC | Age: 80
End: 2025-03-11

## 2025-03-11 NOTE — TELEPHONE ENCOUNTER
Lai with [facility] called in stating a order was faxed over and needs to be signed in order for [employer] to release. Please complete and fax to [phone]

## 2025-03-17 NOTE — TELEPHONE ENCOUNTER
Called and left a message on patient voicemail to have forms faxed to the office, if there are any questions to call the office .

## 2025-03-20 ENCOUNTER — TELEPHONE (OUTPATIENT)
Dept: FAMILY MEDICINE CLINIC | Facility: CLINIC | Age: 80
End: 2025-03-20

## 2025-04-21 ENCOUNTER — OFFICE VISIT (OUTPATIENT)
Dept: FAMILY MEDICINE CLINIC | Facility: CLINIC | Age: 80
End: 2025-04-21
Payer: MEDICARE

## 2025-04-21 VITALS
RESPIRATION RATE: 16 BRPM | OXYGEN SATURATION: 97 % | BODY MASS INDEX: 27.02 KG/M2 | WEIGHT: 146.8 LBS | TEMPERATURE: 97.5 F | HEART RATE: 86 BPM | DIASTOLIC BLOOD PRESSURE: 68 MMHG | HEIGHT: 62 IN | SYSTOLIC BLOOD PRESSURE: 112 MMHG

## 2025-04-21 DIAGNOSIS — E11.9 TYPE 2 DIABETES MELLITUS WITHOUT COMPLICATION, WITHOUT LONG-TERM CURRENT USE OF INSULIN (HCC): Primary | ICD-10-CM

## 2025-04-21 DIAGNOSIS — E11.9 TYPE 2 DIABETES MELLITUS WITHOUT COMPLICATION, WITHOUT LONG-TERM CURRENT USE OF INSULIN (HCC): ICD-10-CM

## 2025-04-21 LAB
EST. AVERAGE GLUCOSE BLD GHB EST-MCNC: 167 MG/DL
HBA1C MFR BLD: 7.4 % (ref 0–5.6)

## 2025-04-21 PROCEDURE — G8427 DOCREV CUR MEDS BY ELIG CLIN: HCPCS | Performed by: FAMILY MEDICINE

## 2025-04-21 PROCEDURE — 1090F PRES/ABSN URINE INCON ASSESS: CPT | Performed by: FAMILY MEDICINE

## 2025-04-21 PROCEDURE — 1123F ACP DISCUSS/DSCN MKR DOCD: CPT | Performed by: FAMILY MEDICINE

## 2025-04-21 PROCEDURE — G8399 PT W/DXA RESULTS DOCUMENT: HCPCS | Performed by: FAMILY MEDICINE

## 2025-04-21 PROCEDURE — 1160F RVW MEDS BY RX/DR IN RCRD: CPT | Performed by: FAMILY MEDICINE

## 2025-04-21 PROCEDURE — 3051F HG A1C>EQUAL 7.0%<8.0%: CPT | Performed by: FAMILY MEDICINE

## 2025-04-21 PROCEDURE — 1036F TOBACCO NON-USER: CPT | Performed by: FAMILY MEDICINE

## 2025-04-21 PROCEDURE — G8417 CALC BMI ABV UP PARAM F/U: HCPCS | Performed by: FAMILY MEDICINE

## 2025-04-21 PROCEDURE — 3074F SYST BP LT 130 MM HG: CPT | Performed by: FAMILY MEDICINE

## 2025-04-21 PROCEDURE — 1159F MED LIST DOCD IN RCRD: CPT | Performed by: FAMILY MEDICINE

## 2025-04-21 PROCEDURE — 99214 OFFICE O/P EST MOD 30 MIN: CPT | Performed by: FAMILY MEDICINE

## 2025-04-21 PROCEDURE — 3078F DIAST BP <80 MM HG: CPT | Performed by: FAMILY MEDICINE

## 2025-04-21 PROCEDURE — G2211 COMPLEX E/M VISIT ADD ON: HCPCS | Performed by: FAMILY MEDICINE

## 2025-04-21 SDOH — ECONOMIC STABILITY: FOOD INSECURITY: WITHIN THE PAST 12 MONTHS, YOU WORRIED THAT YOUR FOOD WOULD RUN OUT BEFORE YOU GOT MONEY TO BUY MORE.: NEVER TRUE

## 2025-04-21 SDOH — ECONOMIC STABILITY: FOOD INSECURITY: WITHIN THE PAST 12 MONTHS, THE FOOD YOU BOUGHT JUST DIDN'T LAST AND YOU DIDN'T HAVE MONEY TO GET MORE.: NEVER TRUE

## 2025-04-21 ASSESSMENT — PATIENT HEALTH QUESTIONNAIRE - PHQ9
1. LITTLE INTEREST OR PLEASURE IN DOING THINGS: NOT AT ALL
SUM OF ALL RESPONSES TO PHQ QUESTIONS 1-9: 0
2. FEELING DOWN, DEPRESSED OR HOPELESS: NOT AT ALL

## 2025-04-21 NOTE — PROGRESS NOTES
Khloe Hendricks (:  1945) is a 80 y.o. female,Established patient, here for evaluation of the following chief complaint(s):  Other (Foot exam)         Assessment & Plan  Type 2 diabetes mellitus without complication, without long-term current use of insulin    See below  Decreased Glipizide 10 mg to 1.5 tabs po bid due to low sugars; await HbA1c, foot exam was done today.    Orders:     DIABETES FOOT EXAM    Hemoglobin A1C; Future      Return if symptoms worsen or fail to improve.       Subjective   HPI  Patient comes today to discuss-  Diabetes, (Overweight)-  at last labs, HbA1c was 9.8 on 25, 7.5 on 24, 7.8 on 24, 8.1 on 3/1/24; her fasting sugars are 66- 140, with readings of 188 and 182; 2 hrs PP are 180, 84; she felt bad when her sugar was 66 - took a glucose tab. She takes Actos 45 mg at HS, Ozempic 0.25 mg SC every week- has to pay $90 a month now- denies any side effects- (we stopped Rybelsus 14 mg daily due to cost). She still takes Metformin 1000 mg 1 tab bid, Glipizide 10 mg 2 tabs twice daily with meals, (stopped Toujeo 2 units in the past, stopped Jardiance 10 mg due to yeast infections). She also takes Aspirin, Atorvastatin 10 mg, fish oil capsule twice a week, Lisinopril 10 mg for  Hypertension. Her lipids have been well controlled, BP have been normal. Denies any associated symptoms. Her last eye exam was in 2024, had b/l cataract surgery on 3/29/21 and 21. She sees a Podiatrist, Dr Samayoa, had surgery in the left foot for inflamed bunion; uses Diabetic shoes, goes every 3 months- they apparently advised her to come in for a foot exam with us. She had surgery on the top of her right foot- has tingling, has stiffness and cannot bend her toes. Left foot- had bunion surgery- feels it is coming back, has pain sometimes- not as bad with diabetic shoes. She is good about watching her sweets and carbohydrates but slips once in a while. (She has lost 1 lb in the

## 2025-04-21 NOTE — ASSESSMENT & PLAN NOTE
See below  Decreased Glipizide 10 mg to 1.5 tabs po bid due to low sugars; await HbA1c, foot exam was done today.    Orders:     DIABETES FOOT EXAM    Hemoglobin A1C; Future

## 2025-04-23 ENCOUNTER — RESULTS FOLLOW-UP (OUTPATIENT)
Dept: FAMILY MEDICINE CLINIC | Facility: CLINIC | Age: 80
End: 2025-04-23

## 2025-05-27 ENCOUNTER — TELEPHONE (OUTPATIENT)
Dept: FAMILY MEDICINE CLINIC | Facility: CLINIC | Age: 80
End: 2025-05-27

## 2025-05-27 DIAGNOSIS — F51.04 PSYCHOPHYSIOLOGICAL INSOMNIA: Chronic | ICD-10-CM

## 2025-05-27 DIAGNOSIS — F41.1 GENERALIZED ANXIETY DISORDER: Chronic | ICD-10-CM

## 2025-05-27 RX ORDER — TRAZODONE HYDROCHLORIDE 100 MG/1
TABLET ORAL
Qty: 60 TABLET | Refills: 0 | Status: SHIPPED | OUTPATIENT
Start: 2025-05-27

## 2025-05-27 NOTE — TELEPHONE ENCOUNTER
Pt. Called in stating she is going to [place] for a few weeks, she is requesting med refill on Trazodone sent to [facility] [address]

## 2025-06-12 ENCOUNTER — TELEPHONE (OUTPATIENT)
Dept: FAMILY MEDICINE CLINIC | Facility: CLINIC | Age: 80
End: 2025-06-12

## 2025-06-12 DIAGNOSIS — F41.1 GENERALIZED ANXIETY DISORDER: Chronic | ICD-10-CM

## 2025-06-12 DIAGNOSIS — E11.9 TYPE 2 DIABETES MELLITUS WITHOUT COMPLICATION, WITHOUT LONG-TERM CURRENT USE OF INSULIN (HCC): Chronic | ICD-10-CM

## 2025-06-12 DIAGNOSIS — K21.9 GASTROESOPHAGEAL REFLUX DISEASE WITHOUT ESOPHAGITIS: Chronic | ICD-10-CM

## 2025-06-12 DIAGNOSIS — F51.04 PSYCHOPHYSIOLOGICAL INSOMNIA: Chronic | ICD-10-CM

## 2025-06-12 DIAGNOSIS — E11.65 TYPE 2 DIABETES MELLITUS WITH HYPERGLYCEMIA, WITHOUT LONG-TERM CURRENT USE OF INSULIN (HCC): ICD-10-CM

## 2025-06-12 DIAGNOSIS — G25.81 RESTLESS LEGS SYNDROME: Chronic | ICD-10-CM

## 2025-06-12 RX ORDER — GLUCOSAMINE HCL/CHONDROITIN SU 500-400 MG
CAPSULE ORAL
Qty: 100 STRIP | Refills: 1 | Status: SHIPPED | OUTPATIENT
Start: 2025-06-12

## 2025-06-12 RX ORDER — ROPINIROLE 2 MG/1
2 TABLET, FILM COATED ORAL 2 TIMES DAILY
Qty: 180 TABLET | Refills: 0 | Status: SHIPPED | OUTPATIENT
Start: 2025-06-12

## 2025-06-12 RX ORDER — PIOGLITAZONE 45 MG/1
45 TABLET ORAL NIGHTLY
Qty: 90 TABLET | Refills: 0 | Status: SHIPPED | OUTPATIENT
Start: 2025-06-12

## 2025-06-12 RX ORDER — GLIPIZIDE 10 MG/1
TABLET ORAL
Qty: 360 TABLET | Refills: 0 | Status: SHIPPED | OUTPATIENT
Start: 2025-06-12

## 2025-06-12 RX ORDER — LISINOPRIL 10 MG/1
10 TABLET ORAL DAILY
Qty: 90 TABLET | Refills: 0 | Status: SHIPPED | OUTPATIENT
Start: 2025-06-12

## 2025-06-12 RX ORDER — TRAZODONE HYDROCHLORIDE 100 MG/1
TABLET ORAL
Qty: 180 TABLET | Refills: 0 | Status: SHIPPED | OUTPATIENT
Start: 2025-06-12

## 2025-06-12 RX ORDER — ATORVASTATIN CALCIUM 10 MG/1
10 TABLET, FILM COATED ORAL DAILY
Qty: 90 TABLET | Refills: 0 | Status: SHIPPED | OUTPATIENT
Start: 2025-06-12

## 2025-06-12 RX ORDER — PREGABALIN 100 MG/1
100 CAPSULE ORAL 2 TIMES DAILY
Qty: 180 CAPSULE | Refills: 0 | Status: SHIPPED | OUTPATIENT
Start: 2025-06-12 | End: 2025-09-10

## 2025-06-12 RX ORDER — AVOBENZONE, HOMOSALATE, OCTISALATE, OCTOCRYLENE 30; 40; 45; 26 MG/ML; MG/ML; MG/ML; MG/ML
CREAM TOPICAL
Qty: 100 EACH | Refills: 1 | Status: SHIPPED | OUTPATIENT
Start: 2025-06-12

## 2025-06-12 RX ORDER — OMEPRAZOLE 40 MG/1
40 CAPSULE, DELAYED RELEASE ORAL DAILY
Qty: 90 CAPSULE | Refills: 0 | Status: SHIPPED | OUTPATIENT
Start: 2025-06-12

## 2025-06-12 NOTE — TELEPHONE ENCOUNTER
Spoke to patient today- they are moving to [place] where her daughter and HATTIE live. She does not have an appointment with a new Provider. She requests printed prescriptions of all her medications- done for 3 months.  Her  will be moving to an Assisted Living facility with rehab. Says he has not been paying any bills. [facility] now.

## 2025-06-30 ENCOUNTER — TELEPHONE (OUTPATIENT)
Dept: FAMILY MEDICINE CLINIC | Facility: CLINIC | Age: 80
End: 2025-06-30

## 2025-07-02 ENCOUNTER — TELEPHONE (OUTPATIENT)
Dept: FAMILY MEDICINE CLINIC | Facility: CLINIC | Age: 80
End: 2025-07-02

## 2025-07-02 DIAGNOSIS — F41.1 GENERALIZED ANXIETY DISORDER: Chronic | ICD-10-CM

## 2025-07-02 DIAGNOSIS — E11.9 TYPE 2 DIABETES MELLITUS WITHOUT COMPLICATION, WITHOUT LONG-TERM CURRENT USE OF INSULIN (HCC): Chronic | ICD-10-CM

## 2025-07-02 DIAGNOSIS — F51.04 PSYCHOPHYSIOLOGICAL INSOMNIA: Chronic | ICD-10-CM

## 2025-07-02 DIAGNOSIS — G25.81 RESTLESS LEGS SYNDROME: Chronic | ICD-10-CM

## 2025-07-02 NOTE — TELEPHONE ENCOUNTER
Hi!    Ms. Hendricks's Daughter called that her mother is relocating to [facility]. And they need her medications sent to a new pharmacy.     There are four that need to be sent there, but she was told by the Pharmacy that one of them, due to it being a controlled substance, it must be sent electronically.     They also stated because the medications are printed on a list on one page, they cannot fill them. Each medication must be sent electronically, on separate pages.     The controlled medication she mentioned:  pregabalin (LYRICA) 100 MG capsule     The others:    pioglitazone (ACTOS) 45 MG tablet     rOPINIRole (REQUIP) 2 MG tablet     traZODone (DESYREL) 100 MG tablet       [facility]    [address] [place] [id number]       Her daughter mentioned her mom is almost out of the Lyrica.

## 2025-07-03 ENCOUNTER — TELEPHONE (OUTPATIENT)
Dept: FAMILY MEDICINE CLINIC | Facility: CLINIC | Age: 80
End: 2025-07-03

## 2025-07-03 DIAGNOSIS — G25.81 RESTLESS LEGS SYNDROME: Chronic | ICD-10-CM

## 2025-07-03 DIAGNOSIS — F41.1 GENERALIZED ANXIETY DISORDER: Chronic | ICD-10-CM

## 2025-07-03 DIAGNOSIS — E11.9 TYPE 2 DIABETES MELLITUS WITHOUT COMPLICATION, WITHOUT LONG-TERM CURRENT USE OF INSULIN (HCC): Chronic | ICD-10-CM

## 2025-07-03 DIAGNOSIS — F51.04 PSYCHOPHYSIOLOGICAL INSOMNIA: Chronic | ICD-10-CM

## 2025-07-03 RX ORDER — PIOGLITAZONE 45 MG/1
45 TABLET ORAL NIGHTLY
Qty: 90 TABLET | Refills: 0 | Status: SHIPPED | OUTPATIENT
Start: 2025-07-03

## 2025-07-03 RX ORDER — ROPINIROLE 2 MG/1
2 TABLET, FILM COATED ORAL 2 TIMES DAILY
Qty: 180 TABLET | Refills: 0 | Status: SHIPPED | OUTPATIENT
Start: 2025-07-03

## 2025-07-03 RX ORDER — TRAZODONE HYDROCHLORIDE 100 MG/1
TABLET ORAL
Qty: 180 TABLET | Refills: 0 | Status: SHIPPED | OUTPATIENT
Start: 2025-07-03

## 2025-07-03 RX ORDER — PREGABALIN 100 MG/1
100 CAPSULE ORAL 2 TIMES DAILY
Qty: 180 CAPSULE | Refills: 0 | Status: SHIPPED | OUTPATIENT
Start: 2025-07-03 | End: 2025-10-01

## 2025-07-03 NOTE — TELEPHONE ENCOUNTER
Pt's daughter called and said the pt is out of state and needs her lyrica refilled   the pharmacy said it needs be e prescribed.    [facility]  [place], [facility]      [phone]  phone          She also needs  pioglitazone   octos,     ropinirole- requip      trazodone -  desyrel     please notify daughter   [phone] when sent in